# Patient Record
Sex: FEMALE | Race: BLACK OR AFRICAN AMERICAN | Employment: UNEMPLOYED | URBAN - METROPOLITAN AREA
[De-identification: names, ages, dates, MRNs, and addresses within clinical notes are randomized per-mention and may not be internally consistent; named-entity substitution may affect disease eponyms.]

---

## 2017-01-13 ENCOUNTER — HOSPITAL ENCOUNTER (OUTPATIENT)
Dept: SURGERY | Age: 42
Discharge: HOME | End: 2017-01-14
Attending: SURGERY | Admitting: SURGERY

## 2017-01-13 ENCOUNTER — HOSPITAL ENCOUNTER (OUTPATIENT)
Dept: SURGERY | Age: 42
Discharge: HOME | End: 2017-01-13
Attending: SURGERY | Admitting: SURGERY

## 2017-01-13 VITALS
RESPIRATION RATE: 18 BRPM | OXYGEN SATURATION: 99 % | TEMPERATURE: 97.9 F | SYSTOLIC BLOOD PRESSURE: 140 MMHG | DIASTOLIC BLOOD PRESSURE: 76 MMHG | HEART RATE: 80 BPM

## 2017-01-13 VITALS
SYSTOLIC BLOOD PRESSURE: 140 MMHG | HEART RATE: 70 BPM | TEMPERATURE: 97 F | WEIGHT: 202.56 LBS | DIASTOLIC BLOOD PRESSURE: 92 MMHG | OXYGEN SATURATION: 100 % | BODY MASS INDEX: 28.25 KG/M2 | RESPIRATION RATE: 16 BRPM

## 2017-01-13 DIAGNOSIS — R52 PAIN: ICD-10-CM

## 2017-01-13 RX ORDER — HYDROMORPHONE HCL 110MG/55ML
0.25 PATIENT CONTROLLED ANALGESIA SYRINGE INTRAVENOUS EVERY 5 MIN PRN
Status: DISCONTINUED | OUTPATIENT
Start: 2017-01-13 | End: 2017-01-15 | Stop reason: HOSPADM

## 2017-01-13 RX ORDER — MEPERIDINE HYDROCHLORIDE 25 MG/ML
12.5 INJECTION INTRAMUSCULAR; INTRAVENOUS; SUBCUTANEOUS EVERY 5 MIN PRN
Status: DISCONTINUED | OUTPATIENT
Start: 2017-01-13 | End: 2017-01-15 | Stop reason: HOSPADM

## 2017-01-13 RX ORDER — SODIUM CHLORIDE, SODIUM LACTATE, POTASSIUM CHLORIDE, CALCIUM CHLORIDE 600; 310; 30; 20 MG/100ML; MG/100ML; MG/100ML; MG/100ML
INJECTION, SOLUTION INTRAVENOUS CONTINUOUS
Status: DISCONTINUED | OUTPATIENT
Start: 2017-01-13 | End: 2017-01-14 | Stop reason: HOSPADM

## 2017-01-13 RX ORDER — LABETALOL HYDROCHLORIDE 5 MG/ML
5 INJECTION, SOLUTION INTRAVENOUS EVERY 10 MIN PRN
Status: DISCONTINUED | OUTPATIENT
Start: 2017-01-13 | End: 2017-01-15 | Stop reason: HOSPADM

## 2017-01-13 RX ORDER — IBUPROFEN 800 MG/1
800 TABLET ORAL ONCE
Status: COMPLETED | OUTPATIENT
Start: 2017-01-13 | End: 2017-01-13

## 2017-01-13 RX ORDER — CEFAZOLIN SODIUM 2 G/100ML
2 INJECTION, SOLUTION INTRAVENOUS ONCE
Status: COMPLETED | OUTPATIENT
Start: 2017-01-13 | End: 2017-01-13

## 2017-01-13 RX ORDER — FENTANYL CITRATE 50 UG/ML
25 INJECTION, SOLUTION INTRAMUSCULAR; INTRAVENOUS EVERY 5 MIN PRN
Status: DISCONTINUED | OUTPATIENT
Start: 2017-01-13 | End: 2017-01-15 | Stop reason: HOSPADM

## 2017-01-13 RX ORDER — IBUPROFEN 800 MG/1
800 TABLET ORAL EVERY 8 HOURS PRN
Qty: 30 TABLET | Refills: 3 | Status: SHIPPED | OUTPATIENT
Start: 2017-01-13

## 2017-01-13 RX ORDER — HYDROMORPHONE HCL 110MG/55ML
0.5 PATIENT CONTROLLED ANALGESIA SYRINGE INTRAVENOUS EVERY 5 MIN PRN
Status: DISCONTINUED | OUTPATIENT
Start: 2017-01-13 | End: 2017-01-15 | Stop reason: HOSPADM

## 2017-01-13 RX ORDER — LIDOCAINE HYDROCHLORIDE 10 MG/ML
0.5 INJECTION, SOLUTION EPIDURAL; INFILTRATION; INTRACAUDAL; PERINEURAL ONCE
Status: DISCONTINUED | OUTPATIENT
Start: 2017-01-13 | End: 2017-01-14 | Stop reason: HOSPADM

## 2017-01-13 RX ORDER — ONDANSETRON 2 MG/ML
4 INJECTION INTRAMUSCULAR; INTRAVENOUS
Status: ACTIVE | OUTPATIENT
Start: 2017-01-13 | End: 2017-01-13

## 2017-01-13 RX ORDER — FENTANYL CITRATE 50 UG/ML
50 INJECTION, SOLUTION INTRAMUSCULAR; INTRAVENOUS EVERY 5 MIN PRN
Status: DISCONTINUED | OUTPATIENT
Start: 2017-01-13 | End: 2017-01-15 | Stop reason: HOSPADM

## 2017-01-13 RX ORDER — HYDROMORPHONE HCL 110MG/55ML
PATIENT CONTROLLED ANALGESIA SYRINGE INTRAVENOUS
Status: DISCONTINUED
Start: 2017-01-13 | End: 2017-01-14 | Stop reason: HOSPADM

## 2017-01-13 RX ORDER — CEFAZOLIN SODIUM 2 G/100ML
2 INJECTION, SOLUTION INTRAVENOUS
Status: DISCONTINUED | OUTPATIENT
Start: 2017-01-13 | End: 2017-01-14 | Stop reason: HOSPADM

## 2017-01-13 RX ADMIN — CEFAZOLIN SODIUM 2 G: 2 INJECTION, SOLUTION INTRAVENOUS at 15:10

## 2017-01-13 RX ADMIN — IBUPROFEN 800 MG: 800 TABLET ORAL at 17:56

## 2017-01-13 RX ADMIN — Medication 0.5 MG: at 17:11

## 2017-01-13 RX ADMIN — SODIUM CHLORIDE, SODIUM LACTATE, POTASSIUM CHLORIDE, CALCIUM CHLORIDE: 600; 310; 30; 20 INJECTION, SOLUTION INTRAVENOUS at 13:19

## 2017-01-13 RX ADMIN — Medication 0.5 MG: at 17:05

## 2017-01-13 RX ADMIN — Medication 0.5 MG: at 17:00

## 2017-01-13 ASSESSMENT — PAIN SCALES - GENERAL
PAINLEVEL_OUTOF10: 10
PAINLEVEL_OUTOF10: 5
PAINLEVEL_OUTOF10: 10
PAINLEVEL_OUTOF10: 10

## 2017-01-13 ASSESSMENT — PAIN DESCRIPTION - PAIN TYPE
TYPE: SURGICAL PAIN
TYPE: SURGICAL PAIN

## 2017-01-13 ASSESSMENT — ENCOUNTER SYMPTOMS: SHORTNESS OF BREATH: 0

## 2017-01-13 ASSESSMENT — PAIN DESCRIPTION - LOCATION
LOCATION: ABDOMEN
LOCATION: ABDOMEN

## 2017-01-13 ASSESSMENT — PAIN - FUNCTIONAL ASSESSMENT: PAIN_FUNCTIONAL_ASSESSMENT: 0-10

## 2017-09-04 ENCOUNTER — APPOINTMENT (OUTPATIENT)
Dept: GENERAL RADIOLOGY | Facility: CLINIC | Age: 42
End: 2017-09-04
Attending: EMERGENCY MEDICINE
Payer: COMMERCIAL

## 2017-09-04 ENCOUNTER — HOSPITAL ENCOUNTER (EMERGENCY)
Facility: CLINIC | Age: 42
Discharge: HOME | End: 2017-09-04
Attending: EMERGENCY MEDICINE | Admitting: EMERGENCY MEDICINE
Payer: COMMERCIAL

## 2017-09-04 VITALS
WEIGHT: 210 LBS | OXYGEN SATURATION: 100 % | BODY MASS INDEX: 29.4 KG/M2 | TEMPERATURE: 98.3 F | HEIGHT: 71 IN | DIASTOLIC BLOOD PRESSURE: 82 MMHG | RESPIRATION RATE: 16 BRPM | SYSTOLIC BLOOD PRESSURE: 123 MMHG

## 2017-09-04 DIAGNOSIS — S63.619A: ICD-10-CM

## 2017-09-04 DIAGNOSIS — S63.602A: ICD-10-CM

## 2017-09-04 PROCEDURE — 99284 EMERGENCY DEPT VISIT MOD MDM: CPT

## 2017-09-04 PROCEDURE — 29125 APPL SHORT ARM SPLINT STATIC: CPT | Mod: RT

## 2017-09-04 PROCEDURE — 25000132 ZZH RX MED GY IP 250 OP 250 PS 637: Performed by: EMERGENCY MEDICINE

## 2017-09-04 PROCEDURE — 73140 X-RAY EXAM OF FINGER(S): CPT | Mod: LT

## 2017-09-04 RX ORDER — IBUPROFEN 600 MG/1
600 TABLET, FILM COATED ORAL EVERY 6 HOURS PRN
Qty: 30 TABLET | Refills: 1 | Status: ON HOLD | OUTPATIENT
Start: 2017-09-04 | End: 2022-09-14

## 2017-09-04 RX ORDER — IBUPROFEN 800 MG/1
800 TABLET, FILM COATED ORAL ONCE
Status: COMPLETED | OUTPATIENT
Start: 2017-09-04 | End: 2017-09-04

## 2017-09-04 RX ADMIN — IBUPROFEN 800 MG: 800 TABLET, FILM COATED ORAL at 10:52

## 2017-09-04 ASSESSMENT — ENCOUNTER SYMPTOMS
CHILLS: 0
COUGH: 0
ARTHRALGIAS: 1
FEVER: 0

## 2017-09-04 NOTE — ED AVS SNAPSHOT
[facility] Emergency Department    [address]    [facility] [address]    Phone:  [phone]    Fax:  [phone]                                       Tari Nicolas   MRN: [id number]    Department:  [facility] Emergency Department   Date of Visit:  9/4/2017           After Visit Summary Signature Page     I have received my discharge instructions, and my questions have been answered. I have discussed any challenges I see with this plan with the nurse or doctor.    ..........................................................................................................................................  Patient/Patient Representative Signature      ..........................................................................................................................................  Patient Representative Print Name and Relationship to Patient    ..................................................               ................................................  Date                                            Time    ..........................................................................................................................................  Reviewed by Signature/Title    ...................................................              ..............................................  Date                                                            Time

## 2017-09-04 NOTE — ED PROVIDER NOTES
"  History     Chief Complaint:  Thumb Pain    HPI   Tari Nicolas is a 41 year old female who presents to the ED for evaluation of thumb pain. The patient was out running last night when she tripped and fell, injuring her thumb. The patient reports that the base of her left thumb is where the most pain is. She took some Ibuprofen this morning. Of note she is right handed.    Allergies:  No known drug allergies     Medications:    The patient is not currently taking any prescribed medications.    Past Medical History:    Cancer    Past Surgical History:    Cholecystectomy  GYN Surgery    Family History:    History reviewed. No pertinent family history.     Social History:  Smoking status: Never  Alcohol use: No  Marital Status:       Review of Systems   Constitutional: Negative for chills and fever.   Respiratory: Negative for cough.    Musculoskeletal: Positive for arthralgias.   All other systems reviewed and are negative.      Physical Exam     Patient Vitals for the past 24 hrs:   BP Temp Temp src Heart Rate Resp SpO2 Height Weight   09/04/17 1044 123/82 98.3  F (36.8  C) Oral 75 16 100 % 1.803 m (5' 11\") 95.3 kg (210 lb)        Physical Exam   Cardiovascular: Normal rate.    Pulmonary/Chest: Effort normal.   Musculoskeletal:        Left elbow: Normal.        Left wrist: Normal.        Hands:  Nursing note and vitals reviewed.      Emergency Department Course   Imaging:  Radiographic findings were communicated with the patient who voiced understanding of the findings.    X-ray Fingers, 2-3 views:  Normal.  As read by Radiology.    Interventions:  1052: Ibuprofen 800 mg, Oral    Emergency Department Course:  Past medical records, nursing notes, and vitals reviewed.  11:15am: I performed an exam of the patient and obtained history, as documented above.  The patient was sent for a xray while in the emergency department, findings above.    I rechecked the patient. Findings and plan explained to the Patient. Patient " discharged home with instructions regarding supportive care, medications, and reasons to return. The importance of close follow-up was reviewed.     Impression & Plan    Medical Decision Making:  Patient presents with a fall on an outstretched hand. Examination shows tenderness over the MCP joint of her left thumb. Xrays are negative for fracture. She was recommended a thumb spike, a Velcro splint, follow up wit orthopedics if no improvement.    Diagnosis:    ICD-10-CM   1. Sprained finger and thumb of left hand, initial encounter S63.619A    S63.602A       Disposition:  discharged to home        Tonie Nogueira  9/4/2017   [facility] EMERGENCY DEPARTMENT  I, Tonie Nogueira, am serving as a scribe at 11:15 AM on 9/4/2017 to document services personally performed by Leandro Esqueda MD based on my observations and the provider's statements to me.        Leandro Esqueda MD  09/09/17 3648

## 2017-09-04 NOTE — DISCHARGE INSTRUCTIONS
Please wear wrist and thumb splint x 1 week. Ice and elevation of the finger.    If no  Improvement in use of thumb, please follow up with orthopedics for recheck.        Finger Sprain  A sprain is a stretching or tearing of the ligaments that hold a joint together. There are no broken bones. Sprains take 3 to 6 weeks to heal.  A sprained finger may be treated with a splint or buddy tape. This is when you tape the injured finger to the one next to it for support. Minor sprains may require no additional support.  Home care    Keep your hand elevated to reduce pain and swelling. This is very important during the first 48 hours.    Apply an ice pack over the injured area for 15 to 20 minutes every 3 to 6 hours. You should do this for the first 24 to 48 hours. You can make an ice pack by filling a plastic bag that seals at the top with ice cubes and then wrapping it with a thin towel. Continue the use of ice packs for relief of pain and swelling as needed. As the ice melts, be careful to avoid getting any wrap or splint wet. After 48 hours, apply heat (warm shower or warm bath) for 15 to 20 minutes several times a day, or alternate ice and heat.    If buddy tape was applied and it becomes wet or dirty, change it. You may replace it with paper, plastic or cloth tape. Cloth tape and paper tapes must be kept dry. Apply gauze or cotton padding between the fingers, especially at the webbed space. This will help prevent the skin from getting moist and breaking down. Keep the buddy tape in place for at least 4 weeks, or as instructed by your healthcare provider.    If a splint was applied, wear it for the time advised.    You may use over-the-counter pain medicine to control pain, unless another pain medicine was prescribed. If you have chronic liver or kidney disease or ever had a stomach ulcer or GI bleeding, talk with your healthcare provider before using these medicines.  Follow-up care  Follow up with your healthcare  provider as directed. Finger joints will become stiff if immobile for too long. If a splint was applied, ask your healthcare provider when it is safe to begin range-of-motion exercises.  Sometimes fractures don t show up on the first X-ray. Bruises and sprains can sometimes hurt as much as a fracture. These injuries can take time to heal completely. If your symptoms don t improve or they get worse, talk with your healthcare provider. You may need a repeat X-ray. If X-rays were taken, you will be told of any new findings that may affect your care.  When to seek medical advice  Call your healthcare provider right away if any of these occur:    Pain or swelling increases    Fingers or hand becomes cold, blue, numb, or tingly  Date Last Reviewed: 11/20/2015 2000-2017 [employer]. [address] [place] [address], [place] [address]. All rights reserved. This information is not intended as a substitute for professional medical care. Always follow your healthcare professional's instructions.

## 2017-09-04 NOTE — ED AVS SNAPSHOT
[facility] Emergency Department    [address]    [facility] [address]    Phone:  [phone]    Fax:  [phone]                                       Tari Nicolas   MRN: [id number]    Department:  [facility] Emergency Department   Date of Visit:  9/4/2017           Patient Information     Date Of Birth          1975        Your diagnoses for this visit were:     Sprained finger and thumb of left hand, initial encounter        You were seen by Leandro Esqueda MD.      Follow-up Information     [facility]    Why:  if no imoprovement    Contact information:    [address]  [address]  [facility] [address] [phone]        Discharge Instructions       Please wear wrist and thumb splint x 1 week. Ice and elevation of the finger.    If no  Improvement in use of thumb, please follow up with orthopedics for recheck.        Finger Sprain  A sprain is a stretching or tearing of the ligaments that hold a joint together. There are no broken bones. Sprains take 3 to 6 weeks to heal.  A sprained finger may be treated with a splint or buddy tape. This is when you tape the injured finger to the one next to it for support. Minor sprains may require no additional support.  Home care    Keep your hand elevated to reduce pain and swelling. This is very important during the first 48 hours.    Apply an ice pack over the injured area for 15 to 20 minutes every 3 to 6 hours. You should do this for the first 24 to 48 hours. You can make an ice pack by filling a plastic bag that seals at the top with ice cubes and then wrapping it with a thin towel. Continue the use of ice packs for relief of pain and swelling as needed. As the ice melts, be careful to avoid getting any wrap or splint wet. After 48 hours, apply heat (warm shower or warm bath) for 15 to 20 minutes several times a day, or alternate ice and  heat.    If anel tape was applied and it becomes wet or dirty, change it. You may replace it with paper, plastic or cloth tape. Cloth tape and paper tapes must be kept dry. Apply gauze or cotton padding between the fingers, especially at the webbed space. This will help prevent the skin from getting moist and breaking down. Keep the anel tape in place for at least 4 weeks, or as instructed by your healthcare provider.    If a splint was applied, wear it for the time advised.    You may use over-the-counter pain medicine to control pain, unless another pain medicine was prescribed. If you have chronic liver or kidney disease or ever had a stomach ulcer or GI bleeding, talk with your healthcare provider before using these medicines.  Follow-up care  Follow up with your healthcare provider as directed. Finger joints will become stiff if immobile for too long. If a splint was applied, ask your healthcare provider when it is safe to begin range-of-motion exercises.  Sometimes fractures don t show up on the first X-ray. Bruises and sprains can sometimes hurt as much as a fracture. These injuries can take time to heal completely. If your symptoms don t improve or they get worse, talk with your healthcare provider. You may need a repeat X-ray. If X-rays were taken, you will be told of any new findings that may affect your care.  When to seek medical advice  Call your healthcare provider right away if any of these occur:    Pain or swelling increases    Fingers or hand becomes cold, blue, numb, or tingly  Date Last Reviewed: 11/20/2015 2000-2017 [employer]. [address] [place] [address], [place] [address]. All rights reserved. This information is not intended as a substitute for professional medical care. Always follow your healthcare professional's instructions.          Future Appointments        Provider Department Dept Phone Center    9/15/2017 9:00 AM Sharon Sanders PA-C [facility] [phone] [employer]      24 Hour Appointment Hotline       To make an appointment at any [facility], call [phone] ([phone]). If you don't have a family doctor or clinic, we will help you find one. [facility] are conveniently located to serve the needs of you and your family.             Review of your medicines      Notice     You have not been prescribed any medications.            Procedures and tests performed during your visit     Fingers XR, 2-3 views, left      Orders Needing Specimen Collection     None      Pending Results     Date and Time Order Name Status Description    9/4/2017 1049 Fingers XR, 2-3 views, left In process             Pending Culture Results     No orders found from 9/2/2017 to 9/5/2017.            Pending Results Instructions     If you had any lab results that were not finalized at the time of your Discharge, you can call the ED Lab Result RN at [phone]. You will be contacted by this team for any positive Lab results or changes in treatment. The nurses are available 7 days a week from 10A to 6:30P.  You can leave a message 24 hours per day and they will return your call.        Test Results From Your Hospital Stay        9/4/2017 11:03 AM      Result not yet available     Exam Ended                Clinical Quality Measure: Blood Pressure Screening     Your blood pressure was checked while you were in the emergency department today. The last reading we obtained was  BP: 123/82 . Please read the guidelines below about what these numbers mean and what you should do about them.  If your systolic blood pressure (the top number) is less than 120 and your diastolic blood pressure (the bottom number) is less than 80, then your blood pressure is normal. There is nothing more that you need to do about it.  If your systolic blood pressure (the top number) is 120-139 or your diastolic blood pressure (the bottom number) is 80-89, your blood pressure may be higher than  "it should be. You should have your blood pressure rechecked within a year by a primary care provider.  If your systolic blood pressure (the top number) is 140 or greater or your diastolic blood pressure (the bottom number) is 90 or greater, you may have high blood pressure. High blood pressure is treatable, but if left untreated over time it can put you at risk for heart attack, stroke, or kidney failure. You should have your blood pressure rechecked by a primary care provider within the next 4 weeks.  If your provider in the emergency department today gave you specific instructions to follow-up with your doctor or provider even sooner than that, you should follow that instruction and not wait for up to 4 weeks for your follow-up visit.        Thank you for choosing [facility]       Thank you for choosing [facility] for your care. Our goal is always to provide you with excellent care. Hearing back from our patients is one way we can continue to improve our services. Please take a few minutes to complete the written survey that you may receive in the mail after you visit with us. Thank you!        Smile Information     Smile lets you send messages to your doctor, view your test results, renew your prescriptions, schedule appointments and more. To sign up, go to [email or web address] . Click on \"Log in\" on the left side of the screen, which will take you to the Welcome page. Then click on \"Sign up Now\" on the right side of the page.     You will be asked to enter the access code listed below, as well as some personal information. Please follow the directions to create your username and password.     Your access code is: [email or web address]  Expires: 12/3/2017 11:26 AM     Your access code will  in 90 days. If you need help or a new code, please call your [facility] clinic or [phone].        Care EveryWhere ID     This is your Care EveryWhere ID. This could be used by other organizations to access your [facility] " medical records  [id number]        Equal Access to Services     RAKAN LYONS : Rose Nova, jeannie deluna, lashonda liang, arsalan lorenzana. So [facility] [phone].    ATENCIÓN: Si habla español, tiene a downs disposición servicios gratuitos de asistencia lingüística. Llame al [phone].    We comply with applicable federal civil rights laws and [place] laws. We do not discriminate on the basis of race, color, national origin, age, disability sex, sexual orientation or gender identity.            After Visit Summary       This is your record. Keep this with you and show to your community pharmacist(s) and doctor(s) at your next visit.

## 2018-01-21 ENCOUNTER — HEALTH MAINTENANCE LETTER (OUTPATIENT)
Age: 43
End: 2018-01-21

## 2018-02-05 ENCOUNTER — HOSPITAL ENCOUNTER (EMERGENCY)
Facility: CLINIC | Age: 43
Discharge: HOME | End: 2018-02-05
Attending: EMERGENCY MEDICINE | Admitting: EMERGENCY MEDICINE
Payer: COMMERCIAL

## 2018-02-05 VITALS
SYSTOLIC BLOOD PRESSURE: 137 MMHG | RESPIRATION RATE: 20 BRPM | BODY MASS INDEX: 30.54 KG/M2 | TEMPERATURE: 99.8 F | DIASTOLIC BLOOD PRESSURE: 91 MMHG | HEART RATE: 92 BPM | OXYGEN SATURATION: 100 % | WEIGHT: 219 LBS

## 2018-02-05 DIAGNOSIS — J06.9 UPPER RESPIRATORY TRACT INFECTION, UNSPECIFIED TYPE: ICD-10-CM

## 2018-02-05 DIAGNOSIS — R10.84 ABDOMINAL PAIN, GENERALIZED: ICD-10-CM

## 2018-02-05 LAB
ALBUMIN UR-MCNC: NEGATIVE MG/DL
APPEARANCE UR: ABNORMAL
BILIRUB UR QL STRIP: NEGATIVE
COLOR UR AUTO: YELLOW
FLUAV+FLUBV AG SPEC QL: NEGATIVE
FLUAV+FLUBV AG SPEC QL: NEGATIVE
GLUCOSE UR STRIP-MCNC: NEGATIVE MG/DL
HCG UR QL: NEGATIVE
HGB UR QL STRIP: NEGATIVE
KETONES UR STRIP-MCNC: NEGATIVE MG/DL
LEUKOCYTE ESTERASE UR QL STRIP: NEGATIVE
MUCOUS THREADS #/AREA URNS LPF: PRESENT /LPF
NITRATE UR QL: NEGATIVE
PH UR STRIP: 5 PH (ref 5–7)
RBC #/AREA URNS AUTO: <1 /HPF (ref 0–2)
SOURCE: ABNORMAL
SP GR UR STRIP: 1.02 (ref 1–1.03)
SPECIMEN SOURCE: NORMAL
SQUAMOUS #/AREA URNS AUTO: 5 /HPF (ref 0–1)
UROBILINOGEN UR STRIP-MCNC: 2 MG/DL (ref 0–2)
WBC #/AREA URNS AUTO: 4 /HPF (ref 0–2)

## 2018-02-05 PROCEDURE — 87086 URINE CULTURE/COLONY COUNT: CPT | Performed by: EMERGENCY MEDICINE

## 2018-02-05 PROCEDURE — 81025 URINE PREGNANCY TEST: CPT | Performed by: EMERGENCY MEDICINE

## 2018-02-05 PROCEDURE — 81001 URINALYSIS AUTO W/SCOPE: CPT | Performed by: EMERGENCY MEDICINE

## 2018-02-05 PROCEDURE — 99283 EMERGENCY DEPT VISIT LOW MDM: CPT

## 2018-02-05 PROCEDURE — 87804 INFLUENZA ASSAY W/OPTIC: CPT | Performed by: EMERGENCY MEDICINE

## 2018-02-05 ASSESSMENT — ENCOUNTER SYMPTOMS
ABDOMINAL PAIN: 1
DYSURIA: 0
DIARRHEA: 1
COUGH: 1
CONSTIPATION: 1
FREQUENCY: 1

## 2018-02-05 NOTE — ED AVS SNAPSHOT
[facility] Emergency Department    [address]    [facility] [address]    Phone:  [phone]    Fax:  [phone]                                       Tari Nicolas   MRN: [id number]    Department:  [facility] Emergency Department   Date of Visit:  2/5/2018           Patient Information     Date Of Birth          1975        Your diagnoses for this visit were:     Upper respiratory tract infection, unspecified type     Abdominal pain, generalized        You were seen by Tracy Irizarry MD.      Follow-up Information     Follow up with Sharon Sanders PA-C.    Specialty:  Physician Assistant    Contact information:    [address]  [facility] [id number] [phone]        Discharge References/Attachments     ABDOMINAL PAIN, ADULT (ENGLISH)    URI, VIRAL, NO ABX (ADULT) (ENGLISH)      24 Hour Appointment Hotline       To make an appointment at any [facility] clinic, call [phone] ([phone]). If you don't have a family doctor or clinic, we will help you find one. [facility] clinics are conveniently located to serve the needs of you and your family.             Review of your medicines      Our records show that you are taking the medicines listed below. If these are incorrect, please call your family doctor or clinic.        Dose / Directions Last dose taken    ibuprofen 600 MG tablet   Commonly known as:  ADVIL/MOTRIN   Dose:  600 mg   Quantity:  30 tablet        Take 1 tablet (600 mg) by mouth every 6 hours as needed for moderate pain   Refills:  1                Procedures and tests performed during your visit     HCG qualitative urine    Influenza A/B antigen    UA with Microscopic      Orders Needing Specimen Collection     None      Pending Results     No orders found from 2/3/2018 to 2/6/2018.            Pending Culture Results     No orders found from 2/3/2018 to 2/6/2018.            Pending Results Instructions     If you  had any lab results that were not finalized at the time of your Discharge, you can call the ED Lab Result RN at [phone]. You will be contacted by this team for any positive Lab results or changes in treatment. The nurses are available 7 days a week from 10A to 6:30P.  You can leave a message 24 hours per day and they will return your call.        Test Results From Your Hospital Stay        2/5/2018  9:06 PM      Component Results     Component Value Ref Range & Units Status    Influenza A/B Agn Specimen Nares  Final    Influenza A Negative NEG^Negative Final    Influenza B Negative NEG^Negative Final    Test results must be correlated with clinical data. If necessary, results   should be confirmed by a molecular assay or viral culture.           2/5/2018  8:54 PM      Component Results     Component Value Ref Range & Units Status    Color Urine Yellow  Final    Appearance Urine Slightly Cloudy  Final    Glucose Urine Negative NEG^Negative mg/dL Final    Bilirubin Urine Negative NEG^Negative Final    Ketones Urine Negative NEG^Negative mg/dL Final    Specific Gravity Urine 1.019 1.003 - 1.035 Final    Blood Urine Negative NEG^Negative Final    pH Urine 5.0 5.0 - 7.0 pH Final    Protein Albumin Urine Negative NEG^Negative mg/dL Final    Urobilinogen mg/dL 2.0 0.0 - 2.0 mg/dL Final    Nitrite Urine Negative NEG^Negative Final    Leukocyte Esterase Urine Negative NEG^Negative Final    Source Midstream Urine  Final    WBC Urine 4 (H) 0 - 2 /HPF Final    RBC Urine <1 0 - 2 /HPF Final    Squamous Epithelial /HPF Urine 5 (H) 0 - 1 /HPF Final    Mucous Urine Present (A) NEG^Negative /LPF Final         2/5/2018  8:54 PM      Component Results     Component Value Ref Range & Units Status    HCG Qual Urine Negative NEG^Negative Final    This test is for screening purposes.  Results should be interpreted along with   the clinical picture.  Confirmation testing is available if warranted by   ordering [id number], HCG Quantitative  Pregnancy.                  Clinical Quality Measure: Blood Pressure Screening     Your blood pressure was checked while you were in the emergency department today. The last reading we obtained was  BP: (!) 137/91 . Please read the guidelines below about what these numbers mean and what you should do about them.  If your systolic blood pressure (the top number) is less than 120 and your diastolic blood pressure (the bottom number) is less than 80, then your blood pressure is normal. There is nothing more that you need to do about it.  If your systolic blood pressure (the top number) is 120-139 or your diastolic blood pressure (the bottom number) is 80-89, your blood pressure may be higher than it should be. You should have your blood pressure rechecked within a year by a primary care provider.  If your systolic blood pressure (the top number) is 140 or greater or your diastolic blood pressure (the bottom number) is 90 or greater, you may have high blood pressure. High blood pressure is treatable, but if left untreated over time it can put you at risk for heart attack, stroke, or kidney failure. You should have your blood pressure rechecked by a primary care provider within the next 4 weeks.  If your provider in the emergency department today gave you specific instructions to follow-up with your doctor or provider even sooner than that, you should follow that instruction and not wait for up to 4 weeks for your follow-up visit.        Thank you for choosing [facility]       Thank you for choosing [facility] for your care. Our goal is always to provide you with excellent care. Hearing back from our patients is one way we can continue to improve our services. Please take a few minutes to complete the written survey that you may receive in the mail after you visit with us. Thank you!        [employer] Information     [employer] lets you send messages to your doctor, view your test results, renew your prescriptions, schedule  "appointments and more. To sign up, go to [email or web address] . Click on \"Log in\" on the left side of the screen, which will take you to the Welcome page. Then click on \"Sign up Now\" on the right side of the page.     You will be asked to enter the access code listed below, as well as some personal information. Please follow the directions to create your username and password.     Your access code is: [email or web address]  Expires: 2018  9:54 PM     Your access code will  in 90 days. If you need help or a new code, please call your [facility] clinic or [phone].        Care EveryWhere ID     This is your Care EveryWhere ID. This could be used by other organizations to access your [facility] medical records  [id number]        Equal Access to Services     RAKAN LYONS : Rose Nova, jeannie deluna, lashonda liang, arsalan gamino . So [facility] [phone].    ATENCIÓN: Si habla español, tiene a downs disposición servicios gratuitos de asistencia lingüística. Llame al [phone].    We comply with applicable federal civil rights laws and [place] laws. We do not discriminate on the basis of race, color, national origin, age, disability, sex, sexual orientation, or gender identity.            After Visit Summary       This is your record. Keep this with you and show to your community pharmacist(s) and doctor(s) at your next visit.                  "

## 2018-02-05 NOTE — LETTER
February 5, 2018      To Whom It May Concern:      Tari Nicolas was seen in our Emergency Department today, 02/05/18.  I expect her condition to improve over the next 4-5 days.  She may return to work/school when improved.    Sincerely,        Amy Partida RN

## 2018-02-05 NOTE — ED AVS SNAPSHOT
[facility] Emergency Department    [address]    [facility] [address]    Phone:  [phone]    Fax:  [phone]                                       Tari Nicolas   MRN: [id number]    Department:  [facility] Emergency Department   Date of Visit:  2/5/2018           After Visit Summary Signature Page     I have received my discharge instructions, and my questions have been answered. I have discussed any challenges I see with this plan with the nurse or doctor.    ..........................................................................................................................................  Patient/Patient Representative Signature      ..........................................................................................................................................  Patient Representative Print Name and Relationship to Patient    ..................................................               ................................................  Date                                            Time    ..........................................................................................................................................  Reviewed by Signature/Title    ...................................................              ..............................................  Date                                                            Time

## 2018-02-06 NOTE — ED NOTES
Patient states she has been having abdominal pain , constipation and diarrhea since yesterday. Patient states she has also been having cold symptoms and dizziness. ABC intact alert and no distress.

## 2018-02-07 LAB
BACTERIA SPEC CULT: NORMAL
Lab: NORMAL
SPECIMEN SOURCE: NORMAL

## 2022-06-28 PROCEDURE — 88300 SURGICAL PATH GROSS: CPT | Performed by: PATHOLOGY

## 2022-06-29 ENCOUNTER — HOSPITAL ENCOUNTER (EMERGENCY)
Facility: CLINIC | Age: 47
Discharge: HOME | End: 2022-06-29
Attending: EMERGENCY MEDICINE | Admitting: EMERGENCY MEDICINE

## 2022-06-29 ENCOUNTER — APPOINTMENT (OUTPATIENT)
Dept: CT IMAGING | Facility: CLINIC | Age: 47
End: 2022-06-29
Attending: EMERGENCY MEDICINE

## 2022-06-29 ENCOUNTER — LAB REQUISITION (OUTPATIENT)
Dept: LAB | Facility: CLINIC | Age: 47
End: 2022-06-29

## 2022-06-29 VITALS
TEMPERATURE: 98.3 F | BODY MASS INDEX: 39.2 KG/M2 | OXYGEN SATURATION: 100 % | HEART RATE: 97 BPM | DIASTOLIC BLOOD PRESSURE: 87 MMHG | SYSTOLIC BLOOD PRESSURE: 161 MMHG | RESPIRATION RATE: 18 BRPM | WEIGHT: 280 LBS | HEIGHT: 71 IN

## 2022-06-29 DIAGNOSIS — E66.09 OTHER OBESITY DUE TO EXCESS CALORIES: ICD-10-CM

## 2022-06-29 DIAGNOSIS — R56.9 CONVULSIONS, UNSPECIFIED CONVULSION TYPE (H): ICD-10-CM

## 2022-06-29 DIAGNOSIS — Z98.890 HISTORY OF ABDOMINAL SURGERY: ICD-10-CM

## 2022-06-29 LAB
ALBUMIN SERPL-MCNC: 3.7 G/DL (ref 3.4–5)
ALBUMIN UR-MCNC: 30 MG/DL
ALP SERPL-CCNC: 86 U/L (ref 40–150)
ALT SERPL W P-5'-P-CCNC: 51 U/L (ref 0–50)
ANION GAP SERPL CALCULATED.3IONS-SCNC: 6 MMOL/L (ref 3–14)
APPEARANCE UR: CLEAR
AST SERPL W P-5'-P-CCNC: 32 U/L (ref 0–45)
ATRIAL RATE - MUSE: 90 BPM
BASOPHILS # BLD AUTO: 0 10E3/UL (ref 0–0.2)
BASOPHILS NFR BLD AUTO: 0 %
BILIRUB SERPL-MCNC: 0.5 MG/DL (ref 0.2–1.3)
BILIRUB UR QL STRIP: NEGATIVE
BUN SERPL-MCNC: 11 MG/DL (ref 7–30)
CALCIUM SERPL-MCNC: 8.9 MG/DL (ref 8.5–10.1)
CHLORIDE BLD-SCNC: 107 MMOL/L (ref 94–109)
CO2 SERPL-SCNC: 23 MMOL/L (ref 20–32)
COLOR UR AUTO: YELLOW
CREAT SERPL-MCNC: 1.02 MG/DL (ref 0.52–1.04)
DIASTOLIC BLOOD PRESSURE - MUSE: NORMAL MMHG
EOSINOPHIL # BLD AUTO: 0 10E3/UL (ref 0–0.7)
EOSINOPHIL NFR BLD AUTO: 0 %
ERYTHROCYTE [DISTWIDTH] IN BLOOD BY AUTOMATED COUNT: 13.6 % (ref 10–15)
GFR SERPL CREATININE-BSD FRML MDRD: 69 ML/MIN/1.73M2
GLUCOSE BLD-MCNC: 135 MG/DL (ref 70–99)
GLUCOSE UR STRIP-MCNC: NEGATIVE MG/DL
HCT VFR BLD AUTO: 37.2 % (ref 35–47)
HGB BLD-MCNC: 11.7 G/DL (ref 11.7–15.7)
HGB UR QL STRIP: NEGATIVE
HYALINE CASTS: 1 /LPF
IMM GRANULOCYTES # BLD: 0.1 10E3/UL
IMM GRANULOCYTES NFR BLD: 1 %
INTERPRETATION ECG - MUSE: NORMAL
KETONES UR STRIP-MCNC: 40 MG/DL
LACTATE SERPL-SCNC: 2 MMOL/L (ref 0.7–2)
LACTATE SERPL-SCNC: 2.1 MMOL/L (ref 0.7–2)
LACTATE SERPL-SCNC: 2.8 MMOL/L (ref 0.7–2)
LEUKOCYTE ESTERASE UR QL STRIP: NEGATIVE
LIPASE SERPL-CCNC: 66 U/L (ref 73–393)
LYMPHOCYTES # BLD AUTO: 1.6 10E3/UL (ref 0.8–5.3)
LYMPHOCYTES NFR BLD AUTO: 9 %
MCH RBC QN AUTO: 27.7 PG (ref 26.5–33)
MCHC RBC AUTO-ENTMCNC: 31.5 G/DL (ref 31.5–36.5)
MCV RBC AUTO: 88 FL (ref 78–100)
MONOCYTES # BLD AUTO: 1.1 10E3/UL (ref 0–1.3)
MONOCYTES NFR BLD AUTO: 6 %
MUCOUS THREADS #/AREA URNS LPF: PRESENT /LPF
NEUTROPHILS # BLD AUTO: 16.4 10E3/UL (ref 1.6–8.3)
NEUTROPHILS NFR BLD AUTO: 84 %
NITRATE UR QL: NEGATIVE
NRBC # BLD AUTO: 0 10E3/UL
NRBC BLD AUTO-RTO: 0 /100
P AXIS - MUSE: 62 DEGREES
PH UR STRIP: 6 [PH] (ref 5–7)
PLATELET # BLD AUTO: 351 10E3/UL (ref 150–450)
POTASSIUM BLD-SCNC: 4.5 MMOL/L (ref 3.4–5.3)
PR INTERVAL - MUSE: 126 MS
PROT SERPL-MCNC: 7.4 G/DL (ref 6.8–8.8)
QRS DURATION - MUSE: 82 MS
QT - MUSE: 358 MS
QTC - MUSE: 437 MS
R AXIS - MUSE: 44 DEGREES
RBC # BLD AUTO: 4.23 10E6/UL (ref 3.8–5.2)
RBC URINE: 1 /HPF
SODIUM SERPL-SCNC: 136 MMOL/L (ref 133–144)
SP GR UR STRIP: 1.03 (ref 1–1.03)
SQUAMOUS EPITHELIAL: 9 /HPF
SYSTOLIC BLOOD PRESSURE - MUSE: NORMAL MMHG
T AXIS - MUSE: 16 DEGREES
TROPONIN I SERPL HS-MCNC: 40 NG/L
UROBILINOGEN UR STRIP-MCNC: NORMAL MG/DL
VENTRICULAR RATE- MUSE: 90 BPM
WBC # BLD AUTO: 19.3 10E3/UL (ref 4–11)
WBC URINE: 4 /HPF

## 2022-06-29 PROCEDURE — 36415 COLL VENOUS BLD VENIPUNCTURE: CPT | Performed by: EMERGENCY MEDICINE

## 2022-06-29 PROCEDURE — 85025 COMPLETE CBC W/AUTO DIFF WBC: CPT | Performed by: EMERGENCY MEDICINE

## 2022-06-29 PROCEDURE — 83605 ASSAY OF LACTIC ACID: CPT | Performed by: EMERGENCY MEDICINE

## 2022-06-29 PROCEDURE — 250N000013 HC RX MED GY IP 250 OP 250 PS 637: Performed by: EMERGENCY MEDICINE

## 2022-06-29 PROCEDURE — 81001 URINALYSIS AUTO W/SCOPE: CPT | Performed by: EMERGENCY MEDICINE

## 2022-06-29 PROCEDURE — 99285 EMERGENCY DEPT VISIT HI MDM: CPT | Mod: 25

## 2022-06-29 PROCEDURE — 258N000003 HC RX IP 258 OP 636: Performed by: EMERGENCY MEDICINE

## 2022-06-29 PROCEDURE — 84484 ASSAY OF TROPONIN QUANT: CPT | Performed by: EMERGENCY MEDICINE

## 2022-06-29 PROCEDURE — 96361 HYDRATE IV INFUSION ADD-ON: CPT

## 2022-06-29 PROCEDURE — 83690 ASSAY OF LIPASE: CPT | Performed by: EMERGENCY MEDICINE

## 2022-06-29 PROCEDURE — 96360 HYDRATION IV INFUSION INIT: CPT

## 2022-06-29 PROCEDURE — 80053 COMPREHEN METABOLIC PANEL: CPT | Performed by: EMERGENCY MEDICINE

## 2022-06-29 PROCEDURE — 93005 ELECTROCARDIOGRAM TRACING: CPT

## 2022-06-29 PROCEDURE — 82040 ASSAY OF SERUM ALBUMIN: CPT | Performed by: EMERGENCY MEDICINE

## 2022-06-29 PROCEDURE — 70450 CT HEAD/BRAIN W/O DYE: CPT

## 2022-06-29 RX ORDER — ACETAMINOPHEN 500 MG
1000 TABLET ORAL ONCE
Status: COMPLETED | OUTPATIENT
Start: 2022-06-29 | End: 2022-06-29

## 2022-06-29 RX ADMIN — SODIUM CHLORIDE 1000 ML: 9 INJECTION, SOLUTION INTRAVENOUS at 14:39

## 2022-06-29 RX ADMIN — SODIUM CHLORIDE 1000 ML: 9 INJECTION, SOLUTION INTRAVENOUS at 16:39

## 2022-06-29 RX ADMIN — SODIUM CHLORIDE 1000 ML: 9 INJECTION, SOLUTION INTRAVENOUS at 19:02

## 2022-06-29 RX ADMIN — ACETAMINOPHEN 1000 MG: 500 TABLET, FILM COATED ORAL at 16:38

## 2022-06-29 NOTE — CONSULTS
[facility]    History and Physical  General Surgery     Date of Admission:  6/29/2022    Chief Complaint   Possible seizure    History is obtained from the patient and patient's spouse    History of Present Illness   Tari Nicolas is a 46 year old female well known to me who presents to [facility] ER for evaluation of possible seizures. She underwent outpatient bariatric surgery yesterday at [facility]; her postoperative course was uneventful. Her  noted she went to use the bathroom early this morning and sustained a fall with a several second period where she was sweating, not breathing and shaking; he reports she has had seizure-like episodes in the past but not to this extent. She has no memory of the episode. She then went to sleep and had another similar episode earlier this afternoon. The patient's  called into our clinic and I instructed them to present to the ER for further evaluation given her recent procedure. The patient does report postoperative abdominal pain and some cramping with sips of liquids; her  reports she did vomit yesterday after returning home but they otherwise deny fevers, chest pain, shortness of breath, diarrhea, abdominal bloating or other symptoms at this time.    Past Medical History    I have reviewed this patient's medical history and updated it with pertinent information if needed.   Past Medical History:   Diagnosis Date     Ovarian cancer        Past Surgical History   I have reviewed this patient's surgical history and updated it with pertinent information if needed.  Past Surgical History:   Procedure Laterality Date     CHOLECYSTECTOMY       GYN SURGERY - Hysterectomy     Laparoscopic sleeve gastrectomy    Prior to Admission Medications   Prior to Admission Medications   Prescriptions Last Dose Informant Patient Reported? Taking?   ibuprofen (ADVIL/MOTRIN) 600 MG tablet   No No   Sig: Take 1 tablet (600 mg) by  mouth every 6 hours as needed for moderate pain      Facility-Administered Medications: None     Allergies   Allergies   Allergen Reactions     Ciprofloxacin Hives     Hydrocodone Dizziness     Oxycodone Dizziness       Social History   Denies tobacco, EtOH or drug use.    Family History   Family history reviewed with patient and is noncontributory.    Review of Systems   The 10 point Review of Systems is negative other than noted in the HPI or here.     Physical Exam   Temp: 98.3  F (36.8  C) Temp src: Oral BP: (!) 173/109 Pulse: 93   Resp: 28 SpO2: 100 % O2 Device: None (Room air)    Vital Signs with Ranges  Temp:  [98.3  F (36.8  C)] 98.3  F (36.8  C)  Pulse:  [90-96] 93  Resp:  [18-28] 28  BP: (132-173)/() 173/109  SpO2:  [100 %] 100 %  280 lbs 0 oz    Constitutional: fatigued, alert and cooperative  Eyes: Lids and lashes normal, pupils equal, round and reactive to light, extra ocular muscles intact, sclera clear, conjunctiva normal  Respiratory: No increased work of breathing, good air exchange, clear to auscultation bilaterally, no crackles or wheezing  Cardiovascular: regular rate and rhythm  GI: scars noted right upper quadrant, left upper quadrant, epigastric and are c/d/i with Dermabond in place, non-distended and appropriately tender to palpation autumn-incisionally-- no rebound or guarding is noted on exam  Skin: normal skin color, texture, turgor and no jaundice  Musculoskeletal: no lower extremity pitting edema present  tone is normal  Neurologic: Awake, alert, oriented to name, place and time.  Cranial nerves II-XII are grossly intact.  Motor is 5 out of 5 bilaterally.  Cerebellar finger to nose, heel to shin intact.  Sensory is intact.  Babinski down going, Romberg negative, and gait is normal.    Data   Results for orders placed or performed during the hospital encounter of 06/29/22 (from the past 24 hour(s))   CBC with platelets + differential    Narrative    The following orders were created  for panel order CBC with platelets + differential.  Procedure                               Abnormality         Status                     ---------                               -----------         ------                     CBC with platelets and d...[[id number]]  Abnormal            Final result                 Please view results for these tests on the individual orders.   Comprehensive metabolic panel   Result Value Ref Range    Sodium 136 133 - 144 mmol/L    Potassium 4.5 3.4 - 5.3 mmol/L    Chloride 107 94 - 109 mmol/L    Carbon Dioxide (CO2) 23 20 - 32 mmol/L    Anion Gap 6 3 - 14 mmol/L    Urea Nitrogen 11 7 - 30 mg/dL    Creatinine 1.02 0.52 - 1.04 mg/dL    Calcium 8.9 8.5 - 10.1 mg/dL    Glucose 135 (H) 70 - 99 mg/dL    Alkaline Phosphatase 86 40 - 150 U/L    AST 32 0 - 45 U/L    ALT 51 (H) 0 - 50 U/L    Protein Total 7.4 6.8 - 8.8 g/dL    Albumin 3.7 3.4 - 5.0 g/dL    Bilirubin Total 0.5 0.2 - 1.3 mg/dL    GFR Estimate 69 >60 mL/min/1.73m2   Lipase   Result Value Ref Range    Lipase 66 (L) 73 - 393 U/L   Lactic acid whole blood   Result Value Ref Range    Lactic Acid 2.1 (H) 0.7 - 2.0 mmol/L   CBC with platelets and differential   Result Value Ref Range    WBC Count 19.3 (H) 4.0 - 11.0 10e3/uL    RBC Count 4.23 3.80 - 5.20 10e6/uL    Hemoglobin 11.7 11.7 - 15.7 g/dL    Hematocrit 37.2 35.0 - 47.0 %    MCV 88 78 - 100 fL    MCH 27.7 26.5 - 33.0 pg    MCHC 31.5 31.5 - 36.5 g/dL    RDW 13.6 10.0 - 15.0 %    Platelet Count 351 150 - 450 10e3/uL    % Neutrophils 84 %    % Lymphocytes 9 %    % Monocytes 6 %    % Eosinophils 0 %    % Basophils 0 %    % Immature Granulocytes 1 %    NRBCs per 100 WBC 0 <1 /100    Absolute Neutrophils 16.4 (H) 1.6 - 8.3 10e3/uL    Absolute Lymphocytes 1.6 0.8 - 5.3 10e3/uL    Absolute Monocytes 1.1 0.0 - 1.3 10e3/uL    Absolute Eosinophils 0.0 0.0 - 0.7 10e3/uL    Absolute Basophils 0.0 0.0 - 0.2 10e3/uL    Absolute Immature Granulocytes 0.1 <=0.4 10e3/uL    Absolute NRBCs 0.0  10e3/uL   Troponin I   Result Value Ref Range    Troponin I High Sensitivity 40 <54 ng/L   CT Head w/o Contrast    Narrative    CT HEAD W/O CONTRAST 6/29/2022 3:53 PM    INDICATION: possible first GTC seizure  TECHNIQUE: CT scan of the head without contrast. Dose reduction  techniques were used.  CONTRAST: None.  COMPARISON: None.    FINDINGS:   No intracranial hemorrhage, extraaxial collection, mass effect or CT  evidence of acute infarct.  Normal parenchymal density for age. The  ventricles and sulci are normal for age. Small amount of soft tissue  swelling at the left temporalis muscle. No skull fracture.  Unremarkable orbits. Paranasal sinuses are free of significant  disease. Clear mastoid air cells.      Impression    IMPRESSION:  1.  No acute intracranial abnormality.    2.  Small amount of soft tissue swelling at the left temporalis muscle  without skull fracture.    ARUN NARAYAN MD         SYSTEM ID:  [id number]   UA with Microscopic   Result Value Ref Range    Color Urine Yellow Colorless, Straw, Light Yellow, Yellow    Appearance Urine Clear Clear    Glucose Urine Negative Negative mg/dL    Bilirubin Urine Negative Negative    Ketones Urine 40  (A) Negative mg/dL    Specific Gravity Urine 1.031 1.003 - 1.035    Blood Urine Negative Negative    pH Urine 6.0 5.0 - 7.0    Protein Albumin Urine 30  (A) Negative mg/dL    Urobilinogen Urine Normal Normal, 2.0 mg/dL    Nitrite Urine Negative Negative    Leukocyte Esterase Urine Negative Negative    Mucus Urine Present (A) None Seen /LPF    RBC Urine 1 <=2 /HPF    WBC Urine 4 <=5 /HPF    Squamous Epithelials Urine 9 (H) <=1 /HPF    Hyaline Casts Urine 1 <=2 /LPF   Lactic acid whole blood   Result Value Ref Range    Lactic Acid 2.1 (H) 0.7 - 2.0 mmol/L      Assessment:  1. Loss of consciousness, possible seizure  2. S/p bariatric surgery  3. Leukocytosis  4. Elevated lactate    Plan:  Patient's vital signs show hypertension but heart rate normal. Abdominal  examination is reassuring given minimal tenderness and no peritoneal signs. CT head imaging obtained which did not demonstrate any obvious pathology leading to seizure-like event described by the patient/patient's . Labs show mild lactic acidosis with lactate of 2.1 and leukocytosis with WBC 19, however this could be post-surgical given her recent procedure. Patient was given IV fluids in the ER for hydration. Repeat lactate pending. Agree with ED recommendation to repeat lactate and for IV fluid hydration. If abdominal exam or vital signs decline, or worsening lactic acidosis, recommend CT abdomen imaging to evaluate for potential intra-abdominal pathology given her recent surgery, and consider admission for close observation. If admitted, I will follow along closely. Agree with outpatient neurology consultation to address potential seizures given similar episodes prior to surgery.    Please do not hesitate to contact me with any questions or concerns.    Michael Villarreal MD  General & Bariatric Surgery  [facility]  Office: [phone]  Direct: [phone]

## 2022-06-29 NOTE — ED NOTES
Bed: ED26  Expected date:   Expected time:   Means of arrival:   Comments:  [facility] - 46 F seizures eta 1414

## 2022-06-29 NOTE — ED TRIAGE NOTES
Pt had gastric sleeve yesterday.  reports syncope/ seizure like activity last night lasting about 30 seconds. Pt did not  pain medication from pharmacy. Another syncope/ seizure like activity today lasting 30-45 seconds. EMS blood sugar 150.      Triage Assessment     Row Name 06/29/22 1429       Respiratory WDL    Rhythm/Pattern, Respiratory depth regular;pattern regular;unlabored    Mucous Membranes pink;intact;moist    Cough Frequency no cough       Cardiac WDL    Cardiac Rhythm radial pulse regular       Chest Pain Assessment    Chest Pain Location --  Pt denies chest pain       Peripheral/Neurovascular WDL    Capillary Refill, General less than/equal to 3 secs       Cognitive/Neuro/Behavioral WDL    Level of Consciousness alert    Orientation oriented x 4    Speech clear;spontaneous;logical    Mood/Behavior calm;cooperative

## 2022-06-29 NOTE — ED PROVIDER NOTES
"  History   Chief Complaint:  Seizures       The history is provided by the spouse and the patient.   History supplemented by electronic chart review     Tari Nicolas is a 46 year old female with history of hyperlipidemia, ovarian cancer s/p remote resection now in remission, depression, and calculus of gallbladder, among others who presents after a gastric sleeve procedure yesterday with two seizure-like episodes that began earlier this morning at roughly 0400 and again at 1200. The patient's  reports that she was having some abdominal pain throughout the evening and woke up earlier this morning at roughly 0400 to use the bathroom, but upon returning she lunged toward the bed with her shorts around her ankles and hit the side of the bed and fell to the floor. While on the floor, her  reports that her whole body was shaking, she was not breathing, and she was not responding for about 20 seconds. She was also diaphoretic. The  notes that when she awoke from this episode, she was rapidly responsive but denies any memory of it happening, but she stayed on the floor for about 90 minutes. Afterward she was finally able to sleep in her bed. Her  then reports that she awoke at 1200 and walked to the bathroom, where another episode similar to the first occurred for about 1 minute. When she awoke from the episode she did not remember the event, but proceeded to lay on the floor for roughly 30 minutes before her  assisted her back to bed. Her  notes history of minor \"convulsions\" while sleeping on occasion in the past, but notes that they were easily resolved.  notes her surgery yesterday went fine. She reports control of bowel movements and bladder. The patient denies alcohol use or drug use. The patient's  notes that she has yet to use her medications prescribed after for her procedure. The patient denies regular medication use. She also denies any known heart " "issues. The patient denies that she may be pregnant given prior hysterectomy. The patient denies having a regular neurologist.     Review of Systems   All other systems reviewed and are negative.      Allergies:  Ciprofloxacin  Hydrocodone  Oxycodone    Medications:  The patient is currently on no regular medications.    Past Medical History:     Calculus of gallbladder without cholecystitis  Ovarian cancer  Hyperlipidemia  Diverticulitis  Pancreatitis  Depression  Migraine with aura  Surgical menopause  Left ankle sprain  Right ovarian cyst  Irregular intermenstrual bleeding  Maternal blood transfusion  Fibroids     Past Surgical History:    Cholecystectomy  Tubal ligation   Hysterectomy     Family History:     Father: alcohol abuse,   Mother: colon polyps, hypertension, anxiety, arthritis, depression, migraines, hyperlipidemia    Social History:    The patient presents to the ED with her  and children.  The patient presents to the ED via EMS.  The patient is a dispatcher.      Physical Exam     Patient Vitals for the past 24 hrs:   BP Temp Temp src Pulse Resp SpO2 Height Weight   06/29/22 2000 (!) 161/87 -- -- 97 18 100 % -- --   06/29/22 1900 (!) 161/89 -- -- -- -- -- -- --   06/29/22 1600 (!) 173/109 -- -- 93 28 100 % -- --   06/29/22 1530 (!) 161/92 -- -- 90 26 100 % -- --   06/29/22 1500 (!) 154/89 -- -- 90 21 100 % -- --   06/29/22 1445 -- -- -- 90 18 100 % -- --   06/29/22 1430 132/87 -- -- 96 24 100 % -- --   06/29/22 1426 (!) 139/94 98.3  F (36.8  C) Oral 93 18 100 % 1.803 m (5' 11\") 127 kg (280 lb)     Physical Exam  General: Nontoxic-appearing woman sitting upright in room 26,  and children at bedside  HENT: mucous membranes moist, tongue normal without evidence of trauma  CV: rate as above, regular rhythm, no lower extremity edema, no murmur audible  Resp: normal effort, speaks in full phrases, no stridor, no cough observed  GI: abdomen soft and with mild expected degree of tenderness " postoperatively, no distention, bowel sounds present, no guarding  MSK: no bony tenderness  Skin: appropriately warm and dry, laparoscopic surgical sites to abdomen clean dry and intact  Neuro: awake, alert, clear speech, fully oriented, face symmetric,  normal, strength and sensation intact in all extr, no nuchal rigidity, ambulation not initially tested  Psych: Cooperative, no apparent hallucinations    Emergency Department Course   ECG 1  ECG taken at 1443, ECG read at 1503  Normal sinus rhythm  Nonspecific ST abnormality   Rate 90 bpm. DC interval 126 ms. QRS duration 82 ms. QT/QTc 358/437 ms. P-R-T axes 62 44 16.     Imaging:  CT Head w/o Contrast   Final Result   IMPRESSION:   1.  No acute intracranial abnormality.      2.  Small amount of soft tissue swelling at the left temporalis muscle   without skull fracture.      ARUN NARAYAN MD            SYSTEM ID:  [id number]        Report per radiology    Laboratory:  Labs Ordered and Resulted from Time of ED Arrival to Time of ED Departure   COMPREHENSIVE METABOLIC PANEL - Abnormal       Result Value    Sodium 136      Potassium 4.5      Chloride 107      Carbon Dioxide (CO2) 23      Anion Gap 6      Urea Nitrogen 11      Creatinine 1.02      Calcium 8.9      Glucose 135 (*)     Alkaline Phosphatase 86      AST 32      ALT 51 (*)     Protein Total 7.4      Albumin 3.7      Bilirubin Total 0.5      GFR Estimate 69     LIPASE - Abnormal    Lipase 66 (*)    LACTIC ACID WHOLE BLOOD - Abnormal    Lactic Acid 2.1 (*)    CBC WITH PLATELETS AND DIFFERENTIAL - Abnormal    WBC Count 19.3 (*)     RBC Count 4.23      Hemoglobin 11.7      Hematocrit 37.2      MCV 88      MCH 27.7      MCHC 31.5      RDW 13.6      Platelet Count 351      % Neutrophils 84      % Lymphocytes 9      % Monocytes 6      % Eosinophils 0      % Basophils 0      % Immature Granulocytes 1      NRBCs per 100 WBC 0      Absolute Neutrophils 16.4 (*)     Absolute Lymphocytes 1.6      Absolute  Monocytes 1.1      Absolute Eosinophils 0.0      Absolute Basophils 0.0      Absolute Immature Granulocytes 0.1      Absolute NRBCs 0.0     ROUTINE UA WITH MICROSCOPIC - Abnormal    Color Urine Yellow      Appearance Urine Clear      Glucose Urine Negative      Bilirubin Urine Negative      Ketones Urine 40  (*)     Specific Gravity Urine 1.031      Blood Urine Negative      pH Urine 6.0      Protein Albumin Urine 30  (*)     Urobilinogen Urine Normal      Nitrite Urine Negative      Leukocyte Esterase Urine Negative      Mucus Urine Present (*)     RBC Urine 1      WBC Urine 4      Squamous Epithelials Urine 9 (*)     Hyaline Casts Urine 1     LACTIC ACID WHOLE BLOOD - Abnormal    Lactic Acid 2.8 (*)    TROPONIN I - Normal    Troponin I High Sensitivity 40     LACTIC ACID WHOLE BLOOD - Normal    Lactic Acid 2.0       Emergency Department Course:       Reviewed:  I reviewed nursing notes, vitals, past medical history and Care Everywhere    Assessments:  1511 I obtained history and examined the patient as noted above.   1559 I rechecked the patient and explained findings.   1849 I rechecked the patient and explained findings.   2043 I rechecked the patient and explained findings.     Consults:  1556 I spoke with Dr. Jon from neurology at  regarding the patient's presentation and plan of care.  1818 I spoke with Dr. Villarreal from bariatric surgery, who came to the ED, regarding the patient's presentation and plan of care.    Interventions:  1439 NS 1000 mL IV  1638 Tylenol 1000 mg PO  1639 NS 1000 mL IV  1902 NS 1000 mL IV    Disposition:  The patient was discharged to home.     Impression & Plan   Medical Decision Making:  I considered not only the possibility of generalized seizures, but also syncope from various causes, pulmonary embolism, arrhythmia, pseudoseizures, direct surgical complications and others.  She does not have peritonitis.  Her abdominal pain is modest and has not worsened since her recent surgery.   Her bariatric surgeon initially contacted me by phone, and later even came to the emergency department to evaluate her at bedside given her recent surgery, though the patient, her , her surgeon, and I all think that it is unlikely she is experiencing any direct structural surgical complication.  She is tolerating oral intake.  She did not have bowel or bladder incontinence, tongue biting, or prolonged confusion to raise higher suspicion for seizure, though she received standard work-up for possible seizure and was given corresponding precautions regarding driving and other activities.  The rationale for not initiating antiepileptic drugs was discussed, along with the rationale for close follow-up through neurology.  Initial lactic acid elevated, improved with fluids.  She has returned to her neurologic baseline and is ambulatory.  Patient and  eager for discharge home which was granted.  Return here for sudden worsening.    Diagnosis:    ICD-10-CM    1. Convulsions, unspecified convulsion type (H)  R56.9    2. History of abdominal surgery  Z98.890      Scribe Disclosure:  I, Andrew Ava, am serving as a scribe at 2:51 PM on 6/29/2022 to document services personally performed by Bradley Yanez MD based on my observations and the provider's statements to me.            Sean Yanez MD  06/30/22 4208

## 2022-06-30 NOTE — ED NOTES
Patient's 3rd liter of fluids finished, repeat lactic sent.   Patient complaining of abdominal soreness. Too soon for more tylenol, does not want anything stronger.   Patient ambulatory to the bathroom independently. Denies dizziness.

## 2022-06-30 NOTE — ED NOTES
Discharge instructions reviewed with patient, who verbalized understanding. Patient departed the ED in no apparent distress.

## 2022-07-05 LAB
PATH REPORT.COMMENTS IMP SPEC: NORMAL
PATH REPORT.COMMENTS IMP SPEC: NORMAL
PATH REPORT.FINAL DX SPEC: NORMAL
PATH REPORT.GROSS SPEC: NORMAL
PATH REPORT.MICROSCOPIC SPEC OTHER STN: NORMAL
PATH REPORT.RELEVANT HX SPEC: NORMAL
PHOTO IMAGE: NORMAL

## 2022-09-04 ENCOUNTER — NURSE TRIAGE (OUTPATIENT)
Dept: NURSING | Facility: CLINIC | Age: 47
End: 2022-09-04

## 2022-09-05 NOTE — TELEPHONE ENCOUNTER
"For the past week and a half, Tari reports having \"bad Acid Reflux\"  She does not have a history of GERD.   She has never experienced anything like this before    In addition she reports:  - Vomiting every day for the past 3 days - 1-2 episodes a day  - Headache - Rated 8/10  - Feels thick drainage in the back of her throat  - Facial pain/pressure - Behind bridge of nose/between eyes    She has tried, without relief:  - Pepto Bismol - vomited it back up  - Tums    She has not tried OTC pain relievers    Advised to see PCP within 2-3 days  Care Advice reviewed    Ashlie Mcneil RN  [facility]      Reason for Disposition    [1] Sinus congestion (pressure, fullness) AND [2] present > 10 days    Nausea lasts > 1 week    Additional Information    Negative: SEVERE difficulty breathing (e.g., struggling for each breath, speaks in single words)    Negative: Sounds like a life-threatening emergency to the triager    Negative: [1] Difficulty breathing AND [2] not from stuffy nose (e.g., not relieved by cleaning out the nose)    Negative: [1] SEVERE headache AND [2] fever    Negative: [1] Redness or swelling on the cheek, forehead or around the eye AND [2] fever    Negative: Fever > 104 F (40 C)    Negative: Patient sounds very sick or weak to the triager    Negative: [1] SEVERE pain AND [2] not improved 2 hours after pain medicine    Negative: [1] Redness or swelling on the cheek, forehead or around the eye AND [2] no fever    Negative: [1] Fever > 101 F (38.3 C) AND [2] age > 60 years    Negative: [1] Fever > 100.0 F (37.8 C) AND [2] bedridden (e.g., nursing home patient, CVA, chronic illness, recovering from surgery)    Negative: [1] Fever > 100.0 F (37.8 C) AND [2] diabetes mellitus or weak immune system (e.g., HIV positive, cancer chemo, splenectomy, organ transplant, chronic steroids)    Negative: Fever present > 3 days (72 hours)    Negative: [1] Fever returns after gone for over 24 hours AND " [2] symptoms worse or not improved    Negative: [1] Sinus pain (not just congestion) AND [2] fever    Negative: Earache    Negative: Shock suspected (e.g., cold/pale/clammy skin, too weak to stand, low BP, rapid pulse)    Negative: Sounds like a life-threatening emergency to the triager    Negative: Unable to walk, or can only walk with assistance (e.g., requires support)    Negative: Difficulty breathing    Negative: [1] Insulin-dependent diabetes (Type I) AND [2] glucose > 400 mg/dl (22 mmol/l)    Negative: [1] Drinking very little AND [2] dehydration suspected (e.g., no urine > 12 hours, very dry mouth, very lightheaded)    Negative: Patient sounds very sick or weak to the triager    Negative: Fever > 104 F (40 C)    Negative: [1] Fever > 101 F (38.3 C) AND [2] age > 60 years    Negative: [1] Fever > 100.0 F (37.8 C) AND [2] bedridden (e.g., nursing home patient, CVA, chronic illness, recovering from surgery)    Negative: [1] Fever > 100.0 F (37.8 C) AND [2] diabetes mellitus or weak immune system (e.g., HIV positive, cancer chemo, splenectomy, organ transplant, chronic steroids)    Negative: Taking any of the following medications: digoxin (Lanoxin), lithium, theophylline, phenytoin (Dilantin)    Negative: Yellowish color of the skin or white of the eye (i.e., jaundice)    Negative: Fever present > 3 days (72 hours)    Negative: Receiving cancer chemotherapy medication    Negative: Taking prescription medication that could cause nausea (e.g., narcotics/opiates, antibiotics, OCPs, many others)    Protocols used: SINUS PAIN OR CONGESTION-A-AH, NAUSEA-A-AH

## 2022-09-06 ENCOUNTER — APPOINTMENT (OUTPATIENT)
Dept: CT IMAGING | Facility: CLINIC | Age: 47
End: 2022-09-06
Attending: EMERGENCY MEDICINE

## 2022-09-06 ENCOUNTER — HOSPITAL ENCOUNTER (EMERGENCY)
Facility: CLINIC | Age: 47
Discharge: HOME | End: 2022-09-06
Attending: EMERGENCY MEDICINE | Admitting: EMERGENCY MEDICINE

## 2022-09-06 VITALS
OXYGEN SATURATION: 100 % | HEART RATE: 69 BPM | TEMPERATURE: 97.8 F | SYSTOLIC BLOOD PRESSURE: 155 MMHG | BODY MASS INDEX: 31.8 KG/M2 | DIASTOLIC BLOOD PRESSURE: 93 MMHG | WEIGHT: 228 LBS | RESPIRATION RATE: 18 BRPM

## 2022-09-06 DIAGNOSIS — R11.2 NAUSEA AND VOMITING, INTRACTABILITY OF VOMITING NOT SPECIFIED, UNSPECIFIED VOMITING TYPE: ICD-10-CM

## 2022-09-06 DIAGNOSIS — K91.870 POSTOPERATIVE HEMATOMA INVOLVING DIGESTIVE SYSTEM FOLLOWING DIGESTIVE SYSTEM PROCEDURE: ICD-10-CM

## 2022-09-06 LAB
ALBUMIN SERPL-MCNC: 3.8 G/DL (ref 3.4–5)
ALBUMIN UR-MCNC: 50 MG/DL
ALP SERPL-CCNC: 98 U/L (ref 40–150)
ALT SERPL W P-5'-P-CCNC: 31 U/L (ref 0–50)
ANION GAP SERPL CALCULATED.3IONS-SCNC: 8 MMOL/L (ref 3–14)
APPEARANCE UR: CLEAR
AST SERPL W P-5'-P-CCNC: 24 U/L (ref 0–45)
BACTERIA #/AREA URNS HPF: ABNORMAL /HPF
BASOPHILS # BLD AUTO: 0.1 10E3/UL (ref 0–0.2)
BASOPHILS NFR BLD AUTO: 0 %
BILIRUB SERPL-MCNC: 0.9 MG/DL (ref 0.2–1.3)
BILIRUB UR QL STRIP: ABNORMAL
BUN SERPL-MCNC: 9 MG/DL (ref 7–30)
CALCIUM SERPL-MCNC: 10.1 MG/DL (ref 8.5–10.1)
CHLORIDE BLD-SCNC: 101 MMOL/L (ref 94–109)
CO2 SERPL-SCNC: 25 MMOL/L (ref 20–32)
COLOR UR AUTO: YELLOW
CREAT SERPL-MCNC: 0.59 MG/DL (ref 0.52–1.04)
EOSINOPHIL # BLD AUTO: 0.1 10E3/UL (ref 0–0.7)
EOSINOPHIL NFR BLD AUTO: 1 %
ERYTHROCYTE [DISTWIDTH] IN BLOOD BY AUTOMATED COUNT: 15.3 % (ref 10–15)
FLUAV RNA SPEC QL NAA+PROBE: NEGATIVE
FLUBV RNA RESP QL NAA+PROBE: NEGATIVE
GFR SERPL CREATININE-BSD FRML MDRD: >90 ML/MIN/1.73M2
GLUCOSE BLD-MCNC: 95 MG/DL (ref 70–99)
GLUCOSE UR STRIP-MCNC: NEGATIVE MG/DL
HCT VFR BLD AUTO: 43.2 % (ref 35–47)
HGB BLD-MCNC: 13.3 G/DL (ref 11.7–15.7)
HGB UR QL STRIP: NEGATIVE
IMM GRANULOCYTES # BLD: 0.1 10E3/UL
IMM GRANULOCYTES NFR BLD: 0 %
KETONES UR STRIP-MCNC: 150 MG/DL
LEUKOCYTE ESTERASE UR QL STRIP: NEGATIVE
LIPASE SERPL-CCNC: 123 U/L (ref 73–393)
LYMPHOCYTES # BLD AUTO: 2.3 10E3/UL (ref 0.8–5.3)
LYMPHOCYTES NFR BLD AUTO: 19 %
MCH RBC QN AUTO: 25.8 PG (ref 26.5–33)
MCHC RBC AUTO-ENTMCNC: 30.8 G/DL (ref 31.5–36.5)
MCV RBC AUTO: 84 FL (ref 78–100)
MONOCYTES # BLD AUTO: 0.7 10E3/UL (ref 0–1.3)
MONOCYTES NFR BLD AUTO: 6 %
MUCOUS THREADS #/AREA URNS LPF: PRESENT /LPF
NEUTROPHILS # BLD AUTO: 9.3 10E3/UL (ref 1.6–8.3)
NEUTROPHILS NFR BLD AUTO: 74 %
NITRATE UR QL: NEGATIVE
NRBC # BLD AUTO: 0 10E3/UL
NRBC BLD AUTO-RTO: 0 /100
PH UR STRIP: 6.5 [PH] (ref 5–7)
PLATELET # BLD AUTO: 277 10E3/UL (ref 150–450)
POTASSIUM BLD-SCNC: 4 MMOL/L (ref 3.4–5.3)
PROT SERPL-MCNC: 8.4 G/DL (ref 6.8–8.8)
RBC # BLD AUTO: 5.15 10E6/UL (ref 3.8–5.2)
RBC URINE: 6 /HPF
RSV RNA SPEC NAA+PROBE: NEGATIVE
SARS-COV-2 RNA RESP QL NAA+PROBE: NEGATIVE
SODIUM SERPL-SCNC: 134 MMOL/L (ref 133–144)
SP GR UR STRIP: 1.01 (ref 1–1.03)
SQUAMOUS EPITHELIAL: 6 /HPF
UROBILINOGEN UR STRIP-MCNC: 4 MG/DL
WBC # BLD AUTO: 12.5 10E3/UL (ref 4–11)
WBC URINE: 2 /HPF

## 2022-09-06 PROCEDURE — 81001 URINALYSIS AUTO W/SCOPE: CPT | Performed by: EMERGENCY MEDICINE

## 2022-09-06 PROCEDURE — 80053 COMPREHEN METABOLIC PANEL: CPT | Performed by: EMERGENCY MEDICINE

## 2022-09-06 PROCEDURE — 87637 SARSCOV2&INF A&B&RSV AMP PRB: CPT | Performed by: EMERGENCY MEDICINE

## 2022-09-06 PROCEDURE — 96375 TX/PRO/DX INJ NEW DRUG ADDON: CPT

## 2022-09-06 PROCEDURE — 85025 COMPLETE CBC W/AUTO DIFF WBC: CPT | Performed by: EMERGENCY MEDICINE

## 2022-09-06 PROCEDURE — 250N000009 HC RX 250: Performed by: EMERGENCY MEDICINE

## 2022-09-06 PROCEDURE — 250N000011 HC RX IP 250 OP 636: Performed by: EMERGENCY MEDICINE

## 2022-09-06 PROCEDURE — 74177 CT ABD & PELVIS W/CONTRAST: CPT

## 2022-09-06 PROCEDURE — 96374 THER/PROPH/DIAG INJ IV PUSH: CPT | Mod: 59

## 2022-09-06 PROCEDURE — 96361 HYDRATE IV INFUSION ADD-ON: CPT

## 2022-09-06 PROCEDURE — 99285 EMERGENCY DEPT VISIT HI MDM: CPT | Mod: CS,25

## 2022-09-06 PROCEDURE — 258N000003 HC RX IP 258 OP 636: Performed by: EMERGENCY MEDICINE

## 2022-09-06 PROCEDURE — 250N000013 HC RX MED GY IP 250 OP 250 PS 637: Performed by: EMERGENCY MEDICINE

## 2022-09-06 PROCEDURE — 83690 ASSAY OF LIPASE: CPT | Performed by: EMERGENCY MEDICINE

## 2022-09-06 PROCEDURE — 36415 COLL VENOUS BLD VENIPUNCTURE: CPT | Performed by: EMERGENCY MEDICINE

## 2022-09-06 PROCEDURE — C9803 HOPD COVID-19 SPEC COLLECT: HCPCS

## 2022-09-06 RX ORDER — PANTOPRAZOLE SODIUM 40 MG/1
40 TABLET, DELAYED RELEASE ORAL DAILY
Qty: 30 TABLET | Refills: 0 | Status: ON HOLD | OUTPATIENT
Start: 2022-09-06 | End: 2022-09-19

## 2022-09-06 RX ORDER — PANTOPRAZOLE SODIUM 40 MG/1
40 TABLET, DELAYED RELEASE ORAL ONCE
Status: COMPLETED | OUTPATIENT
Start: 2022-09-06 | End: 2022-09-06

## 2022-09-06 RX ORDER — ONDANSETRON 4 MG/1
4 TABLET, ORALLY DISINTEGRATING ORAL EVERY 4 HOURS PRN
Qty: 10 TABLET | Refills: 0 | Status: SHIPPED | OUTPATIENT
Start: 2022-09-06 | End: 2022-09-09

## 2022-09-06 RX ORDER — ONDANSETRON 2 MG/ML
4 INJECTION INTRAMUSCULAR; INTRAVENOUS EVERY 30 MIN PRN
Status: DISCONTINUED | OUTPATIENT
Start: 2022-09-06 | End: 2022-09-07 | Stop reason: HOSPADM

## 2022-09-06 RX ORDER — IOPAMIDOL 755 MG/ML
114 INJECTION, SOLUTION INTRAVASCULAR ONCE
Status: COMPLETED | OUTPATIENT
Start: 2022-09-06 | End: 2022-09-06

## 2022-09-06 RX ORDER — HYDROMORPHONE HYDROCHLORIDE 1 MG/ML
0.5 INJECTION, SOLUTION INTRAMUSCULAR; INTRAVENOUS; SUBCUTANEOUS ONCE
Status: DISCONTINUED | OUTPATIENT
Start: 2022-09-06 | End: 2022-09-07 | Stop reason: HOSPADM

## 2022-09-06 RX ORDER — ACETAMINOPHEN 325 MG/1
650 TABLET ORAL ONCE
Status: COMPLETED | OUTPATIENT
Start: 2022-09-06 | End: 2022-09-06

## 2022-09-06 RX ORDER — KETOROLAC TROMETHAMINE 15 MG/ML
15 INJECTION, SOLUTION INTRAMUSCULAR; INTRAVENOUS ONCE
Status: COMPLETED | OUTPATIENT
Start: 2022-09-06 | End: 2022-09-06

## 2022-09-06 RX ADMIN — ONDANSETRON 4 MG: 2 INJECTION INTRAMUSCULAR; INTRAVENOUS at 16:59

## 2022-09-06 RX ADMIN — KETOROLAC TROMETHAMINE 15 MG: 15 INJECTION, SOLUTION INTRAMUSCULAR; INTRAVENOUS at 17:01

## 2022-09-06 RX ADMIN — PANTOPRAZOLE SODIUM 40 MG: 40 TABLET, DELAYED RELEASE ORAL at 22:47

## 2022-09-06 RX ADMIN — SODIUM CHLORIDE 1000 ML: 9 INJECTION, SOLUTION INTRAVENOUS at 17:02

## 2022-09-06 RX ADMIN — SODIUM CHLORIDE 73 ML: 9 INJECTION, SOLUTION INTRAVENOUS at 19:48

## 2022-09-06 RX ADMIN — IOPAMIDOL 114 ML: 755 INJECTION, SOLUTION INTRAVENOUS at 19:48

## 2022-09-06 RX ADMIN — ACETAMINOPHEN 650 MG: 325 TABLET ORAL at 21:39

## 2022-09-06 ASSESSMENT — ENCOUNTER SYMPTOMS
HEMATURIA: 0
FEVER: 0
VOMITING: 1
HEADACHES: 0
DYSURIA: 0
FREQUENCY: 0
ABDOMINAL PAIN: 1
COUGH: 1
DIFFICULTY URINATING: 0
NUMBNESS: 1
DIARRHEA: 1

## 2022-09-06 ASSESSMENT — ACTIVITIES OF DAILY LIVING (ADL): ADLS_ACUITY_SCORE: 33

## 2022-09-06 NOTE — ED NOTES
Rapid Assessment Note    History:   Tari Nicolsa is a 46 year old female who presents with one week of abdominal cramping, vomiting, and diarrhea. Patient says she has been unable to keep anything down since the onset of these symptoms. She also endorses lower extremity numbness and a small rash to her chest. Patient has never experienced symptoms like this before.  No urinary symptoms. She denies fever and headaches. History of a hysterectomy, cholecystectomy, and gastric sleeve. Patient is not vaccinated for COVID-19. She lives with her  and son.     Vitals:   Patient Vitals for the past 24 hrs:   BP Temp Temp src Pulse Resp SpO2 Weight   09/06/22 1640 (!) 155/98 97.3  F (36.3  C) Temporal 88 18 99 % 103.4 kg (228 lb)       Exam:   General:  Alert, interactive  Cardiovascular:  Well perfused  Lungs:  No respiratory distress, no accessory muscle use  Neuro:  Moving all 4 extremities  Skin:  Warm, dry, erythematous macular rash over sternum  Psych:  Normal affect    Plan of Care:   I evaluated the patient and developed an initial plan of care. I discussed this plan and explained that I, or one of my partners, would be returning to complete the evaluation.     I, Pat Wooten, am serving as a scribe to document services personally performed by Davon Jackson MD based on my observations and the provider's statements to me.    09/06/2022  [facility]    Portions of this medical record were completed by a scribe. UPON MY REVIEW AND AUTHENTICATION BY ELECTRONIC SIGNATURE, this confirms (a) I performed the applicable clinical services, and (b) the record is accurate.        Davon Jackson MD  09/06/22 [id number]

## 2022-09-07 NOTE — ED PROVIDER NOTES
History   Chief Complaint:  Abdominal Pain       HPI   Tari Nicolas is a 46 year old female s/p gastric sleeve in June with Dr. Santillan who presents with one week of abdominal cramping, vomiting, and diarrhea. Patient says she has been unable to keep anything down since the onset of these symptoms. She also endorses lower extremity numbness, a small rash to her chest, and a mild cough. Patient has never experienced symptoms like this before.  No urinary symptoms. She denies fever and headaches. She has not had any diarrhea today. History of a hysterectomy and cholecystectomy. Patient is not vaccinated for COVID-19. She lives with her  and son.     Review of Systems   Constitutional: Negative for fever.   Respiratory: Positive for cough.    Gastrointestinal: Positive for abdominal pain, diarrhea (since resolved) and vomiting.   Genitourinary: Negative for decreased urine volume, difficulty urinating, dysuria, frequency, hematuria and urgency.   Skin: Positive for rash.   Neurological: Positive for numbness. Negative for headaches.   All other systems reviewed and are negative.    Allergies:  Ciprofloxacin  Hydrocodone  Oxycodone    Medications:  The patient is not currently taking any prescribed medications.    Past Medical History:     Gallstones   Pancreatitis   Depression  Migraine  Diverticulitis  Granulosa of cell tumor of ovary  Right ovarian cyst   Fibroids  Migraine headache with aura   Ovarian cancer     Past Surgical History:    Cholecystectomy    Tubal ligation   Hysterectomy      Family History:    Father: Alcohol abuse  Mother: Arthritis, depression, HTN, migraines, hyperlipidemia, colon polyps     Social History:  The patient presents to the ED with a family member  PCP: Sharon Jama     Physical Exam     Patient Vitals for the past 24 hrs:   BP Temp Temp src Pulse Resp SpO2 Weight   09/06/22 2142 (!) 155/93 -- -- -- -- -- --   09/06/22 2110 (!) 168/103 97.8  F (36.6  C) Oral 69 --  100 % --   09/06/22 1640 (!) 155/98 97.3  F (36.3  C) Temporal 88 18 99 % 103.4 kg (228 lb)     Physical Exam  Constitutional: Black middle age female supine. No respiratory distress.   HENT: No signs of trauma. Oropharynx clear, no redness or discharge.   Eyes: EOM are normal. Pupils are equal, round, and reactive to light.   Neck: Normal range of motion. No JVD present. No cervical adenopathy.  Cardiovascular: Regular rhythm.  Exam reveals no gallop and no friction rub.    No murmur heard.  Pulmonary/Chest: Bilateral breath sounds normal. No wheezes, rhonchi or rales.  Abdominal: Soft. No rebound or guarding. Upper abdominal tenderness. 2+ femoral pulses. No CVA tenderness.   Musculoskeletal: No edema. No tenderness.   Lymphadenopathy: No lymphadenopathy.   Neurological: Alert and oriented to person, place, and time. Normal strength. Coordination normal.   Skin: Skin is warm and dry. No rash noted. No erythema.     Emergency Department Course   Imaging:  CT Abdomen Pelvis w Contrast   Final Result   IMPRESSION:    1.  Prior sleeve gastrectomy with large fluid collection along the greater curvature of the stomach measuring up to 8.1 cm, favor evolving postoperative hematoma, less likely sequela of leak/abscess.   2.  No evidence of bowel obstruction.        Report per radiology    Laboratory:  Labs Ordered and Resulted from Time of ED Arrival to Time of ED Departure   ROUTINE UA WITH MICROSCOPIC REFLEX TO CULTURE - Abnormal       Result Value    Color Urine Yellow      Appearance Urine Clear      Glucose Urine Negative      Bilirubin Urine Small (*)     Ketones Urine 150  (*)     Specific Gravity Urine 1.015      Blood Urine Negative      pH Urine 6.5      Protein Albumin Urine 50  (*)     Urobilinogen Urine 4.0 (*)     Nitrite Urine Negative      Leukocyte Esterase Urine Negative      Bacteria Urine Few (*)     Mucus Urine Present (*)     RBC Urine 6 (*)     WBC Urine 2      Squamous Epithelials Urine 6 (*)    CBC  WITH PLATELETS AND DIFFERENTIAL - Abnormal    WBC Count 12.5 (*)     RBC Count 5.15      Hemoglobin 13.3      Hematocrit 43.2      MCV 84      MCH 25.8 (*)     MCHC 30.8 (*)     RDW 15.3 (*)     Platelet Count 277      % Neutrophils 74      % Lymphocytes 19      % Monocytes 6      % Eosinophils 1      % Basophils 0      % Immature Granulocytes 0      NRBCs per 100 WBC 0      Absolute Neutrophils 9.3 (*)     Absolute Lymphocytes 2.3      Absolute Monocytes 0.7      Absolute Eosinophils 0.1      Absolute Basophils 0.1      Absolute Immature Granulocytes 0.1      Absolute NRBCs 0.0     COMPREHENSIVE METABOLIC PANEL - Normal    Sodium 134      Potassium 4.0      Chloride 101      Carbon Dioxide (CO2) 25      Anion Gap 8      Urea Nitrogen 9      Creatinine 0.59      Calcium 10.1      Glucose 95      Alkaline Phosphatase 98      AST 24      ALT 31      Protein Total 8.4      Albumin 3.8      Bilirubin Total 0.9      GFR Estimate >90     LIPASE - Normal    Lipase 123     INFLUENZA A/B & SARS-COV2 PCR MULTIPLEX - Normal    Influenza A PCR Negative      Influenza B PCR Negative      RSV PCR Negative      SARS CoV2 PCR Negative     ENTERIC BACTERIA AND VIRUS PANEL BY LAYLA STOOL        Emergency Department Course:   Reviewed:  I reviewed nursing notes, vitals, past medical history and Care Everywhere    Assessments:  2118 I obtained history and examined the patient as noted above.   2300 I rechecked the patient and explained findings.     Consults:   I spoke with Dr. Dennis, the patient's gastric sleeve surgeon, regarding the patient.     Interventions:  1659 Zofran  4 mg  IV  1701 Toradol  15 mg  IV  1702 NS  1L  IV  2132 Dilaudid  0.5 mg  IV  2139 Tylenol  650 mg  PO  2247 Protonix  40 mg  PO    Disposition:  The patient was discharged to home.     Impression & Plan     Medical Decision Making:  This is a 46 year old who presents to the ED complaining of some vomiting, diarrhea, and abdominal pain. She had gastric sleeve  surgery about two months ago which was initially complicated with post op bleeding and passing out. She states the past week she has been feeling some abdominal discomfort. She has had some occasional loose stool and vomiting and has not able to keep anything down. On exam there was some minimal upper abdominal pain. She is not feverish and does not appear septic or toxic. Her workup reveals a negative COVID swab, minimally elevated white count, a stable hemoglobin, and normal chemistries. Her urine does show some ketones but only two white cells. CT was done which does show a fluid collection near the greater curve of the stomach which is most likely representing a post op hematoma. Patient received IV fluids, Zofran, and Protonix. She was able to eat and keep fluids down. I did speak with Dr. Dennis, her surgeon. At this point the patient prefers going home. Dr. Dennis will see her tomorrow and will either do an upper GI or barium endoscopy. I will give the patient Protonix and Zofran to use at home. She is aware to come back for increasing pain, fever, or persistent vomiting.     Covid-19  Tari Nicolas was evaluated during a global COVID-19 pandemic, which necessitated consideration that the patient might be at risk for infection with the SARS-CoV-2 virus that causes COVID-19.   Applicable protocols for evaluation were followed during the patient's care.   COVID-19 was considered as part of the patient's evaluation. The plan for testing is:  a test was obtained during this visit.    COVID-19 Virus (Coronavirus) by PCR Nasopharyngeal Swab: Negative      Diagnosis:    ICD-10-CM    1. Nausea and vomiting, intractability of vomiting not specified, unspecified vomiting type  R11.2    2. Postoperative hematoma involving digestive system following digestive system procedure  K91.870        Discharge Medications:  Discharge Medication List as of 9/6/2022 10:50 PM      START taking these medications    Details    ondansetron (ZOFRAN ODT) 4 MG ODT tab Take 1 tablet (4 mg) by mouth every 4 hours as needed for nausea, Disp-10 tablet, R-0, Local Print      pantoprazole (PROTONIX) 40 MG EC tablet Take 1 tablet (40 mg) by mouth daily for 30 doses, Disp-30 tablet, R-0, Local Print             Scribe Disclosure:  I, Pat Wooten, am serving as a scribe at 9:18 PM on 9/6/2022 to document services personally performed by Davon Jackson MD based on my observations and the provider's statements to me.            Davon Jackson MD  09/06/22 [id number]

## 2022-09-14 ENCOUNTER — HOSPITAL ENCOUNTER (OUTPATIENT)
Facility: CLINIC | Age: 47
DRG: 908 | End: 2022-09-14
Attending: SURGERY | Admitting: SURGERY

## 2022-09-14 ENCOUNTER — HOSPITAL ENCOUNTER (INPATIENT)
Facility: CLINIC | Age: 47
LOS: 6 days | Discharge: HOME | DRG: 908 | End: 2022-09-20
Attending: SURGERY | Admitting: SURGERY

## 2022-09-14 ENCOUNTER — TELEPHONE (OUTPATIENT)
Dept: INTERVENTIONAL RADIOLOGY/VASCULAR | Facility: CLINIC | Age: 47
End: 2022-09-14

## 2022-09-14 DIAGNOSIS — T88.8XXA FLUID COLLECTION AT SURGICAL SITE, INITIAL ENCOUNTER: ICD-10-CM

## 2022-09-14 DIAGNOSIS — T88.8XXD FLUID COLLECTION AT SURGICAL SITE, SUBSEQUENT ENCOUNTER: ICD-10-CM

## 2022-09-14 DIAGNOSIS — K21.9 GASTROESOPHAGEAL REFLUX DISEASE WITHOUT ESOPHAGITIS: Primary | ICD-10-CM

## 2022-09-14 DIAGNOSIS — Z90.3 H/O GASTRIC SLEEVE: ICD-10-CM

## 2022-09-14 DIAGNOSIS — I10 HYPERTENSION, UNSPECIFIED TYPE: ICD-10-CM

## 2022-09-14 PROBLEM — E86.0 DEHYDRATION: Status: ACTIVE | Noted: 2022-09-14

## 2022-09-14 PROCEDURE — 258N000003 HC RX IP 258 OP 636: Performed by: SURGERY

## 2022-09-14 PROCEDURE — 120N000001 HC R&B MED SURG/OB

## 2022-09-14 RX ORDER — PROCHLORPERAZINE MALEATE 10 MG
10 TABLET ORAL EVERY 6 HOURS PRN
Status: DISCONTINUED | OUTPATIENT
Start: 2022-09-14 | End: 2022-09-20 | Stop reason: HOSPADM

## 2022-09-14 RX ORDER — HYDROMORPHONE HCL IN WATER/PF 6 MG/30 ML
0.4 PATIENT CONTROLLED ANALGESIA SYRINGE INTRAVENOUS
Status: DISCONTINUED | OUTPATIENT
Start: 2022-09-14 | End: 2022-09-20 | Stop reason: HOSPADM

## 2022-09-14 RX ORDER — DIPHENHYDRAMINE HYDROCHLORIDE 50 MG/ML
25 INJECTION INTRAMUSCULAR; INTRAVENOUS EVERY 6 HOURS PRN
Status: DISCONTINUED | OUTPATIENT
Start: 2022-09-14 | End: 2022-09-20 | Stop reason: HOSPADM

## 2022-09-14 RX ORDER — NALOXONE HYDROCHLORIDE 0.4 MG/ML
0.2 INJECTION, SOLUTION INTRAMUSCULAR; INTRAVENOUS; SUBCUTANEOUS
Status: DISCONTINUED | OUTPATIENT
Start: 2022-09-14 | End: 2022-09-20 | Stop reason: HOSPADM

## 2022-09-14 RX ORDER — ONDANSETRON 4 MG/1
4 TABLET, ORALLY DISINTEGRATING ORAL EVERY 6 HOURS PRN
Status: DISCONTINUED | OUTPATIENT
Start: 2022-09-14 | End: 2022-09-20 | Stop reason: HOSPADM

## 2022-09-14 RX ORDER — ONDANSETRON 2 MG/ML
4 INJECTION INTRAMUSCULAR; INTRAVENOUS EVERY 6 HOURS PRN
Status: DISCONTINUED | OUTPATIENT
Start: 2022-09-14 | End: 2022-09-20 | Stop reason: HOSPADM

## 2022-09-14 RX ORDER — HYDROMORPHONE HCL IN WATER/PF 6 MG/30 ML
0.2 PATIENT CONTROLLED ANALGESIA SYRINGE INTRAVENOUS
Status: DISCONTINUED | OUTPATIENT
Start: 2022-09-14 | End: 2022-09-20 | Stop reason: HOSPADM

## 2022-09-14 RX ORDER — NALOXONE HYDROCHLORIDE 0.4 MG/ML
0.4 INJECTION, SOLUTION INTRAMUSCULAR; INTRAVENOUS; SUBCUTANEOUS
Status: DISCONTINUED | OUTPATIENT
Start: 2022-09-14 | End: 2022-09-20 | Stop reason: HOSPADM

## 2022-09-14 RX ORDER — METOCLOPRAMIDE 5 MG/1
5 TABLET ORAL EVERY 6 HOURS PRN
Status: ON HOLD | COMMUNITY
Start: 2022-09-07 | End: 2022-09-19

## 2022-09-14 RX ORDER — SODIUM CHLORIDE, SODIUM LACTATE, POTASSIUM CHLORIDE, CALCIUM CHLORIDE 600; 310; 30; 20 MG/100ML; MG/100ML; MG/100ML; MG/100ML
INJECTION, SOLUTION INTRAVENOUS CONTINUOUS
Status: DISCONTINUED | OUTPATIENT
Start: 2022-09-14 | End: 2022-09-16

## 2022-09-14 RX ORDER — DIPHENHYDRAMINE HCL 25 MG
25 CAPSULE ORAL EVERY 6 HOURS PRN
Status: DISCONTINUED | OUTPATIENT
Start: 2022-09-14 | End: 2022-09-20 | Stop reason: HOSPADM

## 2022-09-14 RX ORDER — LIDOCAINE 40 MG/G
CREAM TOPICAL
Status: DISCONTINUED | OUTPATIENT
Start: 2022-09-14 | End: 2022-09-16

## 2022-09-14 RX ORDER — ACETAMINOPHEN 325 MG/1
325-650 TABLET ORAL EVERY 6 HOURS PRN
Status: ON HOLD | COMMUNITY
End: 2022-09-19

## 2022-09-14 RX ORDER — METOPROLOL TARTRATE 1 MG/ML
5 INJECTION, SOLUTION INTRAVENOUS EVERY 6 HOURS PRN
Status: DISCONTINUED | OUTPATIENT
Start: 2022-09-14 | End: 2022-09-18

## 2022-09-14 RX ADMIN — SODIUM CHLORIDE, POTASSIUM CHLORIDE, SODIUM LACTATE AND CALCIUM CHLORIDE: 600; 310; 30; 20 INJECTION, SOLUTION INTRAVENOUS at 19:03

## 2022-09-14 ASSESSMENT — ENCOUNTER SYMPTOMS
COLOR CHANGE: 0
CHEST TIGHTNESS: 0
NAUSEA: 1
VOMITING: 1
BACK PAIN: 0
SEIZURES: 0
SHORTNESS OF BREATH: 0
CHILLS: 0
APPETITE CHANGE: 1
DIFFICULTY URINATING: 0
PALPITATIONS: 0
FLANK PAIN: 0
WHEEZING: 0
FATIGUE: 1
FEVER: 0
WOUND: 0
AGITATION: 0
SORE THROAT: 0
CONFUSION: 0
APNEA: 0
ARTHRALGIAS: 0
NECK PAIN: 0
NUMBNESS: 0
HALLUCINATIONS: 0
HEADACHES: 1
COUGH: 0
WEAKNESS: 1
ABDOMINAL PAIN: 1

## 2022-09-14 ASSESSMENT — ACTIVITIES OF DAILY LIVING (ADL)
ADLS_ACUITY_SCORE: 35
WEAR_GLASSES_OR_BLIND: NO
TOILETING_ISSUES: NO
ADLS_ACUITY_SCORE: 35
FALL_HISTORY_WITHIN_LAST_SIX_MONTHS: NO
WEAR_GLASSES_OR_BLIND: NO
ADLS_ACUITY_SCORE: 29
CONCENTRATING,_REMEMBERING_OR_MAKING_DECISIONS_DIFFICULTY: NO
DOING_ERRANDS_INDEPENDENTLY_DIFFICULTY: NO
DIFFICULTY_EATING/SWALLOWING: NO
CHANGE_IN_FUNCTIONAL_STATUS_SINCE_ONSET_OF_CURRENT_ILLNESS/INJURY: NO
WALKING_OR_CLIMBING_STAIRS_DIFFICULTY: NO
DRESSING/BATHING_DIFFICULTY: NO

## 2022-09-14 NOTE — H&P
Tari Nicolas is an 46 year old female who presents for direct admission secondary to dehydration, nausea, vomiting, GERD, p.o. intolerance, and suspected gastric outlet obstruction secondary to perigastric fluid collection.  She is directly admitted to [facility] following her clinic visit at [facility] earlier today.  The patient has a 2-week history of progressively worsening nausea, vomiting, GERD, and p.o. intolerance; she has a history of gastric surgery in June 2022 and her postoperative course was complicated by acute blood loss anemia which was managed conservatively with iron supplementation and has since corrected.  She was progressing well with her postoperative diet, and at her 6-week follow-up appointment was tolerating solid foods with minimal heartburn symptoms. However, she began experiencing worsening nausea, vomiting, and acid reflux; she presented to [facility] ER last week and underwent CT imaging that demonstrated evidence of a perigastric intra-abdominal fluid collection which is thought to be secondary to a resolving postoperative hematoma.  Since her ER discharge, she has continued to struggle with p.o. intake and now experiences several episodes of nausea, nonbloody/nonbilious vomiting, and is unable to tolerate even small amounts of clear liquids.  Given her symptoms, she return to clinic earlier today and was given 2 L of crystalloid IV fluids to assist with hydration.  I reviewed her CT imaging with her and given her persistent symptoms, recommend percutaneous drainage of the fluid collection in an attempt to reduce extrinsic compression of her gastric remnant and alleviate her symptoms.  The patient currently denies chest pain, shortness of breath, fevers, chills diarrhea, constipation, or bloody stools; she does report severe nausea, vomiting, and inability to keep down clear liquids.    Past Medical History:   Diagnosis Date     Cancer  (H)     Ovarian cancer       Allergies:   Allergies   Allergen Reactions     Ciprofloxacin Hives     Hydrocodone Dizziness     Oxycodone Dizziness       Active Problems:    * No active hospital problems. *    There were no vitals taken for this visit.    Review of Systems   Constitutional: Positive for appetite change and fatigue. Negative for chills and fever.   HENT: Negative for congestion and sore throat.    Respiratory: Negative for apnea, cough, chest tightness, shortness of breath and wheezing.    Cardiovascular: Negative for chest pain, palpitations and leg swelling.   Gastrointestinal: Positive for abdominal pain, nausea and vomiting.   Genitourinary: Negative for difficulty urinating and flank pain.   Musculoskeletal: Negative for arthralgias, back pain and neck pain.   Skin: Negative for color change, rash and wound.   Neurological: Positive for weakness and headaches. Negative for seizures, syncope and numbness.   Psychiatric/Behavioral: Negative for agitation, confusion and hallucinations.       Physical Exam  Constitutional:       Appearance: She is ill-appearing.   HENT:      Head: Normocephalic and atraumatic.      Nose: Nose normal. No congestion.      Mouth/Throat:      Mouth: Mucous membranes are dry.      Pharynx: Oropharynx is clear.   Eyes:      Extraocular Movements: Extraocular movements intact.      Conjunctiva/sclera: Conjunctivae normal.      Pupils: Pupils are equal, round, and reactive to light.   Cardiovascular:      Rate and Rhythm: Regular rhythm. Tachycardia present.   Pulmonary:      Effort: Pulmonary effort is normal. No respiratory distress.      Breath sounds: Normal breath sounds. No stridor. No wheezing or rales.   Abdominal:      General: Abdomen is flat. There is no distension.      Palpations: Abdomen is soft.      Tenderness: There is abdominal tenderness (in the LUQ and epigastrium). There is no guarding or rebound.   Musculoskeletal:         General: No swelling or  deformity. Normal range of motion.      Cervical back: Normal range of motion and neck supple.      Right lower leg: No edema.      Left lower leg: No edema.   Skin:     General: Skin is warm and dry.      Coloration: Skin is not jaundiced.   Neurological:      General: No focal deficit present.      Mental Status: She is alert and oriented to person, place, and time.      Sensory: No sensory deficit.      Gait: Gait normal.   Psychiatric:         Mood and Affect: Mood normal.         Behavior: Behavior normal.         Thought Content: Thought content normal.         Assessment/Plan:  1.  Dehydration  -Begin peripheral IV, will begin continuous IV crystalloid fluids for rehydration; will obtain metabolic panel and electrolytes and correct any deficiencies as needed. Strict Is/Os-- monitor UOP    2.  Nausea/vomiting  -Parenteral antiemetics as needed for nausea control.  She will remain n.p.o. for her procedure tomorrow    3.  Perigastric intra-abdominal fluid collection, thought to be secondary to postoperative hematoma  -IR consulted and will perform percutaneous drainage tomorrow morning.  We will follow-up drain output and perform routine drain care to facilitate drainage of the collection; at this time, patient does not have any evidence of sepsis/infection, will hold off on IV antibiotics at this time and await drainage    4.  Gastric outlet obstruction secondary to perigastric fluid collection  -Proceed with IR drainage.  Nausea and vomiting likely secondary to this, will continue antiemetics as needed    VTE ppx: SCDs while in bed; encourage ambulation. Will hold chemical ppx at this time while awaiting IR drain placement    Disposition: Floor    CRISTINA OLIVA MD  9/14/2022

## 2022-09-15 ENCOUNTER — APPOINTMENT (OUTPATIENT)
Dept: CT IMAGING | Facility: CLINIC | Age: 47
DRG: 908 | End: 2022-09-15
Attending: SURGERY

## 2022-09-15 PROBLEM — R11.2 NAUSEA AND VOMITING: Status: ACTIVE | Noted: 2022-09-15

## 2022-09-15 PROBLEM — T88.8XXA FLUID COLLECTION AT SURGICAL SITE: Status: ACTIVE | Noted: 2022-09-15

## 2022-09-15 LAB
ALBUMIN SERPL-MCNC: 3.2 G/DL (ref 3.4–5)
ALP SERPL-CCNC: 84 U/L (ref 40–150)
ALT SERPL W P-5'-P-CCNC: 22 U/L (ref 0–50)
ANION GAP SERPL CALCULATED.3IONS-SCNC: 10 MMOL/L (ref 3–14)
APTT PPP: 29 SECONDS (ref 22–38)
AST SERPL W P-5'-P-CCNC: 14 U/L (ref 0–45)
BASOPHILS # BLD AUTO: 0 10E3/UL (ref 0–0.2)
BASOPHILS NFR BLD AUTO: 0 %
BILIRUB SERPL-MCNC: 1.3 MG/DL (ref 0.2–1.3)
BUN SERPL-MCNC: 7 MG/DL (ref 7–30)
CALCIUM SERPL-MCNC: 9.3 MG/DL (ref 8.5–10.1)
CHLORIDE BLD-SCNC: 101 MMOL/L (ref 94–109)
CO2 SERPL-SCNC: 24 MMOL/L (ref 20–32)
CREAT SERPL-MCNC: 0.56 MG/DL (ref 0.52–1.04)
EOSINOPHIL # BLD AUTO: 0.2 10E3/UL (ref 0–0.7)
EOSINOPHIL NFR BLD AUTO: 2 %
ERYTHROCYTE [DISTWIDTH] IN BLOOD BY AUTOMATED COUNT: 15.4 % (ref 10–15)
GFR SERPL CREATININE-BSD FRML MDRD: >90 ML/MIN/1.73M2
GLUCOSE BLD-MCNC: 90 MG/DL (ref 70–99)
GLUCOSE BLDC GLUCOMTR-MCNC: 104 MG/DL (ref 70–99)
GRAM STAIN RESULT: NORMAL
GRAM STAIN RESULT: NORMAL
HCT VFR BLD AUTO: 38.2 % (ref 35–47)
HGB BLD-MCNC: 11.7 G/DL (ref 11.7–15.7)
IMM GRANULOCYTES # BLD: 0.1 10E3/UL
IMM GRANULOCYTES NFR BLD: 0 %
INR PPP: 1.19 (ref 0.85–1.15)
LYMPHOCYTES # BLD AUTO: 2.6 10E3/UL (ref 0.8–5.3)
LYMPHOCYTES NFR BLD AUTO: 23 %
MAGNESIUM SERPL-MCNC: 1.9 MG/DL (ref 1.6–2.3)
MCH RBC QN AUTO: 25.5 PG (ref 26.5–33)
MCHC RBC AUTO-ENTMCNC: 30.6 G/DL (ref 31.5–36.5)
MCV RBC AUTO: 83 FL (ref 78–100)
MONOCYTES # BLD AUTO: 0.7 10E3/UL (ref 0–1.3)
MONOCYTES NFR BLD AUTO: 6 %
NEUTROPHILS # BLD AUTO: 7.9 10E3/UL (ref 1.6–8.3)
NEUTROPHILS NFR BLD AUTO: 69 %
NRBC # BLD AUTO: 0 10E3/UL
NRBC BLD AUTO-RTO: 0 /100
PHOSPHATE SERPL-MCNC: 2.7 MG/DL (ref 2.5–4.5)
PLATELET # BLD AUTO: 240 10E3/UL (ref 150–450)
POTASSIUM BLD-SCNC: 3.4 MMOL/L (ref 3.4–5.3)
PROT SERPL-MCNC: 6.9 G/DL (ref 6.8–8.8)
RBC # BLD AUTO: 4.58 10E6/UL (ref 3.8–5.2)
SARS-COV-2 RNA RESP QL NAA+PROBE: NEGATIVE
SODIUM SERPL-SCNC: 135 MMOL/L (ref 133–144)
WBC # BLD AUTO: 11.6 10E3/UL (ref 4–11)

## 2022-09-15 PROCEDURE — 99152 MOD SED SAME PHYS/QHP 5/>YRS: CPT

## 2022-09-15 PROCEDURE — 999N000154 HC STATISTIC RADIOLOGY XRAY, US, CT, MAR, NM

## 2022-09-15 PROCEDURE — 49406 IMAGE CATH FLUID PERI/RETRO: CPT

## 2022-09-15 PROCEDURE — 87205 SMEAR GRAM STAIN: CPT | Performed by: SURGERY

## 2022-09-15 PROCEDURE — 258N000003 HC RX IP 258 OP 636: Performed by: SURGERY

## 2022-09-15 PROCEDURE — 84100 ASSAY OF PHOSPHORUS: CPT | Performed by: SURGERY

## 2022-09-15 PROCEDURE — 83735 ASSAY OF MAGNESIUM: CPT | Performed by: SURGERY

## 2022-09-15 PROCEDURE — 250N000013 HC RX MED GY IP 250 OP 250 PS 637: Performed by: RADIOLOGY

## 2022-09-15 PROCEDURE — 85014 HEMATOCRIT: CPT | Performed by: SURGERY

## 2022-09-15 PROCEDURE — 84134 ASSAY OF PREALBUMIN: CPT | Performed by: SURGERY

## 2022-09-15 PROCEDURE — C9113 INJ PANTOPRAZOLE SODIUM, VIA: HCPCS | Performed by: SURGERY

## 2022-09-15 PROCEDURE — 250N000009 HC RX 250: Performed by: SURGERY

## 2022-09-15 PROCEDURE — 250N000011 HC RX IP 250 OP 636: Performed by: RADIOLOGY

## 2022-09-15 PROCEDURE — 250N000011 HC RX IP 250 OP 636: Performed by: SURGERY

## 2022-09-15 PROCEDURE — 85730 THROMBOPLASTIN TIME PARTIAL: CPT | Performed by: RADIOLOGY

## 2022-09-15 PROCEDURE — 87075 CULTR BACTERIA EXCEPT BLOOD: CPT | Performed by: SURGERY

## 2022-09-15 PROCEDURE — 120N000001 HC R&B MED SURG/OB

## 2022-09-15 PROCEDURE — 0W9G30Z DRAINAGE OF PERITONEAL CAVITY WITH DRAINAGE DEVICE, PERCUTANEOUS APPROACH: ICD-10-PCS | Performed by: RADIOLOGY

## 2022-09-15 PROCEDURE — 85610 PROTHROMBIN TIME: CPT | Performed by: RADIOLOGY

## 2022-09-15 PROCEDURE — 80053 COMPREHEN METABOLIC PANEL: CPT | Performed by: SURGERY

## 2022-09-15 PROCEDURE — 87070 CULTURE OTHR SPECIMN AEROBIC: CPT | Performed by: SURGERY

## 2022-09-15 PROCEDURE — 36415 COLL VENOUS BLD VENIPUNCTURE: CPT | Performed by: SURGERY

## 2022-09-15 PROCEDURE — 272N000431 CT ABDOMEN PERITONEUM ABSCESS DRAIN W CATH PLACE

## 2022-09-15 PROCEDURE — U0003 INFECTIOUS AGENT DETECTION BY NUCLEIC ACID (DNA OR RNA); SEVERE ACUTE RESPIRATORY SYNDROME CORONAVIRUS 2 (SARS-COV-2) (CORONAVIRUS DISEASE [COVID-19]), AMPLIFIED PROBE TECHNIQUE, MAKING USE OF HIGH THROUGHPUT TECHNOLOGIES AS DESCRIBED BY CMS-2020-01-R: HCPCS | Performed by: SURGERY

## 2022-09-15 RX ORDER — HEPARIN SODIUM 5000 [USP'U]/.5ML
5000 INJECTION, SOLUTION INTRAVENOUS; SUBCUTANEOUS EVERY 12 HOURS
Status: DISCONTINUED | OUTPATIENT
Start: 2022-09-15 | End: 2022-09-18

## 2022-09-15 RX ORDER — NALOXONE HYDROCHLORIDE 0.4 MG/ML
0.4 INJECTION, SOLUTION INTRAMUSCULAR; INTRAVENOUS; SUBCUTANEOUS
Status: DISCONTINUED | OUTPATIENT
Start: 2022-09-15 | End: 2022-09-15

## 2022-09-15 RX ORDER — NALOXONE HYDROCHLORIDE 0.4 MG/ML
0.2 INJECTION, SOLUTION INTRAMUSCULAR; INTRAVENOUS; SUBCUTANEOUS
Status: DISCONTINUED | OUTPATIENT
Start: 2022-09-15 | End: 2022-09-15

## 2022-09-15 RX ORDER — FENTANYL CITRATE 50 UG/ML
25-50 INJECTION, SOLUTION INTRAMUSCULAR; INTRAVENOUS EVERY 5 MIN PRN
Status: DISCONTINUED | OUTPATIENT
Start: 2022-09-15 | End: 2022-09-15

## 2022-09-15 RX ORDER — LIDOCAINE 40 MG/G
CREAM TOPICAL
Status: DISCONTINUED | OUTPATIENT
Start: 2022-09-15 | End: 2022-09-18 | Stop reason: HOSPADM

## 2022-09-15 RX ORDER — METHOCARBAMOL 100 MG/ML
500 INJECTION, SOLUTION INTRAMUSCULAR; INTRAVENOUS EVERY 8 HOURS PRN
Status: DISCONTINUED | OUTPATIENT
Start: 2022-09-15 | End: 2022-09-16

## 2022-09-15 RX ORDER — FLUMAZENIL 0.1 MG/ML
0.2 INJECTION, SOLUTION INTRAVENOUS
Status: DISCONTINUED | OUTPATIENT
Start: 2022-09-15 | End: 2022-09-15

## 2022-09-15 RX ORDER — ACETAMINOPHEN 325 MG/1
650 TABLET ORAL EVERY 4 HOURS PRN
Status: COMPLETED | OUTPATIENT
Start: 2022-09-15 | End: 2022-09-18

## 2022-09-15 RX ORDER — IBUPROFEN 400 MG/1
400 TABLET, FILM COATED ORAL EVERY 6 HOURS PRN
Status: DISCONTINUED | OUTPATIENT
Start: 2022-09-15 | End: 2022-09-15

## 2022-09-15 RX ADMIN — SODIUM CHLORIDE, POTASSIUM CHLORIDE, SODIUM LACTATE AND CALCIUM CHLORIDE: 600; 310; 30; 20 INJECTION, SOLUTION INTRAVENOUS at 20:07

## 2022-09-15 RX ADMIN — METOPROLOL TARTRATE 5 MG: 5 INJECTION INTRAVENOUS at 20:44

## 2022-09-15 RX ADMIN — MIDAZOLAM HYDROCHLORIDE 1 MG: 1 INJECTION, SOLUTION INTRAMUSCULAR; INTRAVENOUS at 09:33

## 2022-09-15 RX ADMIN — ACETAMINOPHEN 650 MG: 325 TABLET ORAL at 17:21

## 2022-09-15 RX ADMIN — PANTOPRAZOLE SODIUM 40 MG: 40 INJECTION, POWDER, FOR SOLUTION INTRAVENOUS at 08:06

## 2022-09-15 RX ADMIN — FENTANYL CITRATE 50 MCG: 50 INJECTION, SOLUTION INTRAMUSCULAR; INTRAVENOUS at 09:33

## 2022-09-15 RX ADMIN — HEPARIN SODIUM 5000 UNITS: 5000 INJECTION, SOLUTION INTRAVENOUS; SUBCUTANEOUS at 20:07

## 2022-09-15 RX ADMIN — ACETAMINOPHEN 650 MG: 325 TABLET ORAL at 11:21

## 2022-09-15 RX ADMIN — PROCHLORPERAZINE EDISYLATE 10 MG: 5 INJECTION INTRAMUSCULAR; INTRAVENOUS at 08:14

## 2022-09-15 ASSESSMENT — ACTIVITIES OF DAILY LIVING (ADL)
ADLS_ACUITY_SCORE: 18

## 2022-09-15 NOTE — PROGRESS NOTES
9/14 1900  9/15 0000  9/15 0400    Observation Goals:  - discharge needs not yet met    Orientation/Cognitive: Alert and oriented x4  Observation Goals (Met/ Not Met): Not met  Mobility Level/Assist Equipment: Independent  Fall Risk (Y/N): No  Behavior Concerns: None  Pain Management: PRN Dilaudid  Tele/VS/O2: BP (!) 169/110 (BP Location: Right arm, Patient Position: Semi-Osborn's)   Pulse 87   Temp 97.9  F (36.6  C) (Oral)   Resp 16   SpO2 97%   ABNL Lab/BG: WBC elv.  Diet: NPO  Bowel/Bladder: Some retention, straight cath 475ml, sticky note for provider left  Skin Concerns: None  Drains/Devices: IVF  Tests/Procedures for next shift: IR Drain placement  Anticipated DC date & active delays: 9/16  Patient Stated Goal for Today: Pain management

## 2022-09-15 NOTE — PROGRESS NOTES
"GENERAL SURGERY PROGRESS NOTE    SUBJECTIVE:  Patient reports persistent nausea and vomiting/reflux intermittently overnight; overall feels slightly better than yesterday with IV fluids overnight. Straight cath overnight for urinary retention-- 475 ml output.    OBJECTIVE:  Vital signs:  Temp: 97.9  F (36.6  C) Temp src: Oral BP: (!) 169/110 Pulse: 87   Resp: 16 SpO2: 97 % O2 Device: None (Room air)        Estimated body mass index is 31.8 kg/m  as calculated from the following:    Height as of 6/29/22: 1.803 m (5' 11\").    Weight as of 9/6/22: 103.4 kg (228 lb).      GENERAL: NAD  RESPIRATORY: No dyspnea  CARDIOVASCULAR: RRR  ABDOMEN: Soft, nondistended, mildly ttp in LUQ/epigastrium without rebound or guarding  EXTREMITIES: No edema or tenderness    PERTINENT LABS or IMAGING:  AM lab work pending    ASSESSMENT:  46 year old female with perigastric fluid collection (thought to be secondary to postoperative hematoma) with dehydration, nausea, vomiting and GERD.    PLAN:  - NPO for procedure today  - Obtain COVID-19 screening PCR for planned intervention with IR  - IR to place percutaneous drain today to drain intra-abdominal perigastric fluid collection  - Pending patient's symptom improvement, may begin clear liquid diet post-procedure  - Will need routine drain care (flush with 10 ml qshift), record output    Dispo: floor    CRISTINA OLIVA MD on 9/15/2022 at 7:35 AM  Minimally Invasive & Bariatric Surgery   [facility]      "

## 2022-09-15 NOTE — PROGRESS NOTES
RADIOLOGY PROCEDURE NOTE  Patient name: Tari Nicolas  MRN: [id number]  : 1975    Pre-procedure diagnosis: Hematoma   Post-procedure diagnosis: Same    Procedure Date/Time: September 15, 2022  10:08 AM  Procedure: Percutaneous Drain Placement  Estimated blood loss: None  Specimen(s) collected with description: 8 mL blood  The patient tolerated the procedure well with no immediate complications.  I determined this patient to be an appropriate candidate for the planned sedation and procedure and reassessed the patient IMMEDIATELY PRIOR to sedation and procedure.    See imaging dictation for procedural details and findings.    Provider name: Trevor Woodruff MD  Assistant(s):None

## 2022-09-15 NOTE — PHARMACY-ADMISSION MEDICATION HISTORY
Pharmacy Medication History  Admission medication history interview status for the 9/14/2022  admission is complete. See EPIC admission navigator for prior to admission medications     Location of Interview: Patient room  Medication history sources: Patient and Surescripts    Significant changes made to the medication list:      In the past week, patient estimated taking medication this percent of the time: greater than 90%    Additional medication history information:       Medication reconciliation completed by provider prior to medication history? No    Time spent in this activity: 5min     Prior to Admission medications    Medication Sig Last Dose Taking? Auth Provider Long Term End Date   acetaminophen (TYLENOL) 325 MG tablet Take 325-650 mg by mouth every 6 hours as needed for mild pain  Yes Unknown, Entered By History     metoclopramide (REGLAN) 5 MG tablet 5 mg by Oral or NG Tube route every 6 hours as needed  Yes Reported, Patient     pantoprazole (PROTONIX) 40 MG EC tablet Take 1 tablet (40 mg) by mouth daily for 30 doses not taking  Davon Jackson MD  10/6/22       The information provided in this note is only as accurate as the sources available at the time of update(s)   Emmy DislaD

## 2022-09-15 NOTE — PROGRESS NOTES
SPIRITUAL HEALTH SERVICES Progress Note  [facility]  Short Stay    PT sleeping following procedure today. Follow-up with PT in the next 24 -48 hours.    [facility]

## 2022-09-15 NOTE — UTILIZATION REVIEW
Admission Status; Secondary Review Determination    Under the authority of the Utilization Management Committee, the utilization review process indicated a secondary review on the above patient. The review outcome is based on review of the medical records, discussions with staff, and applying clinical experience noted on the date of the review.    (x) Inpatient Status Appropriate - This patient's medical care is consistent with medical management for inpatient care and reasonable inpatient medical practice.    RATIONALE FOR DETERMINATION:  Complex 46-year-old female with history of remote ovarian cancer in remission, gallstones, status post gastric sleeve procedure the end of June 2022 followed by new onset of seizures the following day.  Patient then developed abdominal cramping with intermittent vomiting and diarrhea in early September difficulty tolerating oral intake.  CT imaging revealed a prior sleeve gastrectomy with large fluid collection along the greater curvature of the stomach measuring 8.1 cm most consistent with a postoperative hematoma less likely leak or abscess.  Patient had mild leukocytosis, normal electrolytes and after antiemetics, anti-inflammatory agents, IV fluids patient returned home to follow-up with outpatient gastroenterology.  Patient unfortunately has continued to struggle with p.o. intake and now experiencing several episodes of nausea and vomiting and cannot tolerate even small amounts of clear liquids.  Thus it is felt that patient's significant fluid collection is causing extrinsic compression of her gastric remnant with obstructive symptoms.  Inpatient management appropriate for pain control, IV fluids and attempt interventional radiology drainage of the intra-abdominal perigastric fluid collection in hopes of resolving patient's symptoms.    At the time of admission with the information available to the attending physician more than 2 nights Hospital complex care was  anticipated, based on patient risk of adverse outcome if treated as outpatient and complex care required. Inpatient admission is appropriate based on the Medicare guidelines.    This document was produced using voice recognition software    The information on this document is developed by the utilization review team in order for the business office to ensure compliance. This only denotes the appropriateness of proper admission status and does not reflect the quality of care rendered.    The definitions of Inpatient Status and Observation Status used in making the determination above are those provided in the CMS Coverage Manual, Chapter 1 and Chapter 6, section 70.4.    Sincerely,    Calvin Carranza MD  Utilization Review  Physician Advisor  [facility].

## 2022-09-15 NOTE — PLAN OF CARE
Problem: Plan of Care - These are the overarching goals to be used throughout the patient stay.    Goal: Plan of Care Review/Shift Note  Description: The Plan of Care Review/Shift note should be completed every shift.  The Outcome Evaluation is a brief statement about your assessment that the patient is improving, declining, or no change.  This information will be displayed automatically on your shift note.  Outcome: Ongoing, Progressing  Flowsheets (Taken 9/14/2022 2321)  Plan of Care Reviewed With: patient  Overall Patient Progress: no change     Problem: Plan of Care - These are the overarching goals to be used throughout the patient stay.    Goal: Readiness for Transition of Care  Intervention: Mutually Develop Transition Plan  Recent Flowsheet Documentation  Taken 9/14/2022 2300 by Jany Lizama, RN  Equipment Currently Used at Home: none

## 2022-09-16 LAB
ALBUMIN UR-MCNC: NEGATIVE MG/DL
APPEARANCE UR: CLEAR
BILIRUB UR QL STRIP: NEGATIVE
COLOR UR AUTO: ABNORMAL
GLUCOSE BLDC GLUCOMTR-MCNC: 98 MG/DL (ref 70–99)
GLUCOSE UR STRIP-MCNC: NEGATIVE MG/DL
HGB UR QL STRIP: NEGATIVE
KETONES UR STRIP-MCNC: 60 MG/DL
LEUKOCYTE ESTERASE UR QL STRIP: ABNORMAL
MUCOUS THREADS #/AREA URNS LPF: PRESENT /LPF
NITRATE UR QL: NEGATIVE
PH UR STRIP: 7 [PH] (ref 5–7)
PREALB SERPL IA-MCNC: 13 MG/DL (ref 15–45)
RBC URINE: <1 /HPF
SP GR UR STRIP: 1.01 (ref 1–1.03)
SQUAMOUS EPITHELIAL: 2 /HPF
UROBILINOGEN UR STRIP-MCNC: NORMAL MG/DL
WBC URINE: 3 /HPF

## 2022-09-16 PROCEDURE — 81001 URINALYSIS AUTO W/SCOPE: CPT | Performed by: SURGERY

## 2022-09-16 PROCEDURE — 258N000003 HC RX IP 258 OP 636: Performed by: SURGERY

## 2022-09-16 PROCEDURE — 120N000001 HC R&B MED SURG/OB

## 2022-09-16 PROCEDURE — 250N000011 HC RX IP 250 OP 636: Performed by: SURGERY

## 2022-09-16 PROCEDURE — 250N000009 HC RX 250: Performed by: SURGERY

## 2022-09-16 RX ORDER — DEXTROSE MONOHYDRATE, SODIUM CHLORIDE, AND POTASSIUM CHLORIDE 50; 1.49; 4.5 G/1000ML; G/1000ML; G/1000ML
INJECTION, SOLUTION INTRAVENOUS CONTINUOUS
Status: DISCONTINUED | OUTPATIENT
Start: 2022-09-16 | End: 2022-09-19

## 2022-09-16 RX ORDER — METHOCARBAMOL 500 MG/1
500 TABLET, FILM COATED ORAL 3 TIMES DAILY PRN
Status: DISCONTINUED | OUTPATIENT
Start: 2022-09-16 | End: 2022-09-20 | Stop reason: HOSPADM

## 2022-09-16 RX ADMIN — SODIUM CHLORIDE, POTASSIUM CHLORIDE, SODIUM LACTATE AND CALCIUM CHLORIDE: 600; 310; 30; 20 INJECTION, SOLUTION INTRAVENOUS at 09:18

## 2022-09-16 RX ADMIN — HEPARIN SODIUM 5000 UNITS: 5000 INJECTION, SOLUTION INTRAVENOUS; SUBCUTANEOUS at 20:08

## 2022-09-16 RX ADMIN — HEPARIN SODIUM 5000 UNITS: 5000 INJECTION, SOLUTION INTRAVENOUS; SUBCUTANEOUS at 08:19

## 2022-09-16 RX ADMIN — POTASSIUM CHLORIDE, DEXTROSE MONOHYDRATE AND SODIUM CHLORIDE: 150; 5; 450 INJECTION, SOLUTION INTRAVENOUS at 18:33

## 2022-09-16 RX ADMIN — METOPROLOL TARTRATE 5 MG: 5 INJECTION INTRAVENOUS at 20:08

## 2022-09-16 ASSESSMENT — ACTIVITIES OF DAILY LIVING (ADL)
ADLS_ACUITY_SCORE: 20
ADLS_ACUITY_SCORE: 18
ADLS_ACUITY_SCORE: 20
ADLS_ACUITY_SCORE: 18
ADLS_ACUITY_SCORE: 20
ADLS_ACUITY_SCORE: 18

## 2022-09-16 NOTE — PROGRESS NOTES
Orientation/Cognitive: Alert and oriented x4  Observation Goals (Met/ Not Met): In pt  Mobility Level/Assist Equipment: SBA  Fall Risk (Y/N): No  Behavior Concerns: None  Pain Management:Tylenol  Tele/VS/O2: Slightly elevated BP, other VSS on RA.   ABNL Lab/BG: INR 1.19,WBC 11.6,    Diet: Clears  Bowel/Bladder: Continent. Now voiding ok.   Skin Concerns: None  Drains/Devices: IVF infusing as ordered. RUTH drain, flushed as ordered. Scanty out.   Tests/Procedures for next shift: None  Anticipated DC date & active delays: 9/16  Patient Stated Goal for Today: Pain management

## 2022-09-16 NOTE — PROGRESS NOTES
Past Medical History


Past Medical History:  Hypothyroid


Past Surgical History:  Appendectomy, Other


Additional Past Surgical Histo:  NECK SX


Additional Information:  


SMOKES 1 PK/DAY


Alcohol Use:  Occasionally


Drug Use:  None





Adult General


Chief Complaint


Chief Complaint:  NEURO SYMPTOMS/DEFICITS





[facility]


HPI





Patient is a 55  East[]





AS INPATIENT


Admitting Physician:  Joseline Núñez


Condition:  STABLE


Referrals:  


YOAN GUERRA MD (PCP)





Problem Qualifiers








 Additional Impressions:  


 UTI (urinary tract infection)


 Urinary tract infection type:  site unspecified  Hematuria presence:  without 

hematuria  Qualified Codes:  N39.0 - Urinary tract infection, site not specified








ANAYELI HARO Aug 23, 2017 11:15 SPIRITUAL HEALTH SERVICES Progress Note  [facility] [address] [facility] per patient request    Pt Tari was visibly tired throughout visit, so it was kept rather brief. She named a desire for resources related to affording her medical care, which I mentioned to unit SW.     Pt shared that it has been difficult to be away from her  and nine-year-old son. She identified as [employer] and welcomed prayer for recovery.     [facility] will follow up upon patient request for spiritual or emotional support.    Davida Campbell  Chaplain Resident  Spiritual Health Services  Pager: [phone]

## 2022-09-16 NOTE — PROGRESS NOTES
Orientation/Cognitive: A/O x4     Observation Goals (Met/ Not Met): Inpatient     Mobility Level/Assist Equipment: Up w/ SBA    Fall Risk (Y/N): No    Behavior Concerns: None, flat affect     Pain Management: denies pain     Tele/VS/O2:  VSS on RA, ex HTN, prn given w/ effect     ABNL Lab/BG: INR 1.19,WBC 11.6,      Diet: Clear liquid     Bowel/Bladder: Continent, voiding      Skin Concerns: RUTH drain site CDI    Drains/Devices: IVF @ 75/hr. RUTH drain, flushed as ordered.     Tests/Procedures for next shift: None    Anticipated DC date & active delays: 9/16??    Patient Stated Goal for Today: rest

## 2022-09-16 NOTE — PROGRESS NOTES
Interventional Radiology - Progress Note  Inpatient - [facility]  9/16/2022    S:  Minimal discomfort at abdominal drain site. No other complaints at this time.    Culture:    Gram Stain Result  No organisms seen      2+ WBC seen           Cultures NGTD      Flushing: 10mL sterile saline q8h, subtact flush from output totals      O:  BP (!) 167/103   Pulse 83   Temp 97.4  F (36.3  C) (Oral)   Resp 18   SpO2 99%   General:  Stable.  In no acute distress.    Neuro:  A&O x 3. Moves all extremities equally.  Abd: Midline upper abdominal drain with bloody output in collection bulb      Labs (Last four results)  CMPRecent Labs   Lab 09/16/22  0705 09/15/22  2110 09/15/22  0625   POTASSIUM  --   --  3.4   GLC 98 104* 90   BUN  --   --  7   CR  --   --  0.56   GFRESTIMATED  --   --  >90     CBC  Recent Labs   Lab 09/15/22  0625   WBC 11.6*   RBC 4.58   HGB 11.7   HCT 38.2        INR  Recent Labs   Lab 09/15/22  1100   INR 1.19*       Drain Outputs (in mL):  9/15 3   9/16 22                   A:  Perigastric hematoma s/p drain placement 9/15/22    P:    Flush drain as ordered  To follow up as outpatient with Dr Villarreal per chart  Drain discharge instructions placed in chart    Rajiv James PA-C  Interventional Radiology  *[id number]  [phone]      Total time spent on the date of the encounter is 20 minutes, including time spent counseling the patient, performing a medically appropriate evaluation, reviewing prior medical history, ordering medications and tests, documenting clinical information in the medical record, and communication of results.

## 2022-09-16 NOTE — CONSULTS
Writer emailed Mehdi (financial counselor) and let her know that patient doesn't have medical insurance. Asked if she could reach out to patient. Mehdi emailed stating that she would call patient.    KRUNAL Morales

## 2022-09-16 NOTE — PROGRESS NOTES
"GENERAL SURGERY PROGRESS NOTE    SUBJECTIVE:  RUTH drain placed yesterday and flushed per order; today with dark liquid serosanguineous output in bulb and tubing. Patient reports some improvement in nausea and vomiting; tolerated 260 ml of clear liquids yesterday. This morning having some intermittent nausea, dizziness and reports some blurry vision-- she notes anti-emetic medications tend to cause these symptoms. Pain controlled.    OBJECTIVE:  Vital signs:  Temp: 97.4  F (36.3  C) Temp src: Oral BP: (!) 167/103 Pulse: 83   Resp: 18 SpO2: 99 % O2 Device: None (Room air) Oxygen Delivery: 2 LPM      Estimated body mass index is 31.8 kg/m  as calculated from the following:    Height as of 6/29/22: 1.803 m (5' 11\").    Weight as of 9/6/22: 103.4 kg (228 lb).      GENERAL: NAD  RESPIRATORY: No dyspnea  CARDIOVASCULAR: RRR  ABDOMEN: soft, non-distended, appropriately ttp autumn-incisionally; RUTH drain with 3-way stopcock has dark serosanguineous fluid output in tubing and bulb.  EXTREMITIES: No edema or tenderness    PERTINENT LABS or IMAGING:  Perc drain gram stain shows no organisms, culture with NGTD    ASSESSMENT:  46 year old female with perigastric fluid collection (secondary to postoperative hematoma), s/p percutaneous drain placement on 9/15/22 by Interventional Radiology, with nausea, vomiting and GERD. Dehydration resolved.     PLAN:  - Clear liquid diet as tolerated; instructed patient to sip liquids slowly with aim to take in 4 fl oz/hr. Patient will remain on CLD at discharge  - Anti-emetics PRN for nausea and vomiting-- I anticipate these symptoms may persist until entire hematoma is drained  - Will need routine drain care (flush with 10 ml saline q8h, with 5 ml toward patient and 5 ml toward RUTH bulb), record output daily. Drain teaching today in anticipation of discharge today or tomorrow. Patient will keep drain at discharge and will follow up as outpatient with Dr. Villarreal at [facility]    Dispo: " floor; discharge pending improvement in PO intake    CRISTINA OLIVA MD on 9/16/2022 at 8:54 AM  Minimally Invasive & Bariatric Surgery   [facility]

## 2022-09-16 NOTE — PLAN OF CARE
Orientation/Cognitive: A&O  Observation Goals (Met/ Not Met): Inp  Mobility Level/Assist Equipment: SBA, IV pole. Dizzy at times  Fall Risk (Y/N): Y  Behavior Concerns: N  Pain Management: denies  Tele/VS/O2: htn at times. Metoprolol prn avail, not given. RA.  ABNL Lab/BG: UA collected. INR  Diet: clear liq. Not tolerating. NV, interm emesis. Refusing protonix and antiemetics rt blurry vision, provider aware. Encouragement provided several times.   Bowel/Bladder: Cont. RUTH drain, prior sleeve gastrectomy likely postop hematoma, irrigated and stripped, dark red. RUTH education provided.   Skin Concerns:   Drains/Devices:  site cdi. IV inf  Tests/Procedures for next shift:   Anticipated DC date & active delays: 9-17   Patient Stated Goal for Today: rest. Feel better.

## 2022-09-17 ENCOUNTER — ANESTHESIA EVENT (OUTPATIENT)
Dept: SURGERY | Facility: CLINIC | Age: 47
DRG: 908 | End: 2022-09-17

## 2022-09-17 PROCEDURE — 250N000013 HC RX MED GY IP 250 OP 250 PS 637: Performed by: RADIOLOGY

## 2022-09-17 PROCEDURE — 99221 1ST HOSP IP/OBS SF/LOW 40: CPT | Performed by: NURSE PRACTITIONER

## 2022-09-17 PROCEDURE — 250N000011 HC RX IP 250 OP 636: Performed by: NURSE PRACTITIONER

## 2022-09-17 PROCEDURE — 250N000013 HC RX MED GY IP 250 OP 250 PS 637: Performed by: NURSE PRACTITIONER

## 2022-09-17 PROCEDURE — 250N000011 HC RX IP 250 OP 636: Performed by: SURGERY

## 2022-09-17 PROCEDURE — 250N000009 HC RX 250: Performed by: SURGERY

## 2022-09-17 PROCEDURE — 258N000003 HC RX IP 258 OP 636: Performed by: SURGERY

## 2022-09-17 PROCEDURE — 120N000001 HC R&B MED SURG/OB

## 2022-09-17 RX ORDER — HYDRALAZINE HYDROCHLORIDE 20 MG/ML
10 INJECTION INTRAMUSCULAR; INTRAVENOUS EVERY 6 HOURS PRN
Status: DISCONTINUED | OUTPATIENT
Start: 2022-09-17 | End: 2022-09-18

## 2022-09-17 RX ORDER — AMLODIPINE BESYLATE 10 MG/1
10 TABLET ORAL DAILY
Status: DISCONTINUED | OUTPATIENT
Start: 2022-09-17 | End: 2022-09-18

## 2022-09-17 RX ADMIN — HEPARIN SODIUM 5000 UNITS: 5000 INJECTION, SOLUTION INTRAVENOUS; SUBCUTANEOUS at 08:59

## 2022-09-17 RX ADMIN — HEPARIN SODIUM 5000 UNITS: 5000 INJECTION, SOLUTION INTRAVENOUS; SUBCUTANEOUS at 20:02

## 2022-09-17 RX ADMIN — HYDRALAZINE HYDROCHLORIDE 10 MG: 20 INJECTION INTRAMUSCULAR; INTRAVENOUS at 12:17

## 2022-09-17 RX ADMIN — ACETAMINOPHEN 650 MG: 325 TABLET ORAL at 21:21

## 2022-09-17 RX ADMIN — METOPROLOL TARTRATE 5 MG: 5 INJECTION INTRAVENOUS at 01:47

## 2022-09-17 RX ADMIN — POTASSIUM CHLORIDE, DEXTROSE MONOHYDRATE AND SODIUM CHLORIDE: 150; 5; 450 INJECTION, SOLUTION INTRAVENOUS at 17:16

## 2022-09-17 RX ADMIN — METOPROLOL TARTRATE 5 MG: 5 INJECTION INTRAVENOUS at 08:53

## 2022-09-17 RX ADMIN — FAMOTIDINE 20 MG: 10 INJECTION INTRAVENOUS at 20:02

## 2022-09-17 RX ADMIN — AMLODIPINE BESYLATE 10 MG: 10 TABLET ORAL at 09:59

## 2022-09-17 RX ADMIN — FAMOTIDINE 20 MG: 10 INJECTION INTRAVENOUS at 09:42

## 2022-09-17 ASSESSMENT — ACTIVITIES OF DAILY LIVING (ADL)
ADLS_ACUITY_SCORE: 20
ADLS_ACUITY_SCORE: 22
ADLS_ACUITY_SCORE: 20
ADLS_ACUITY_SCORE: 20
ADLS_ACUITY_SCORE: 22
ADLS_ACUITY_SCORE: 20

## 2022-09-17 ASSESSMENT — COPD QUESTIONNAIRES: COPD: 0

## 2022-09-17 ASSESSMENT — LIFESTYLE VARIABLES: TOBACCO_USE: 0

## 2022-09-17 NOTE — PLAN OF CARE
Goal Outcome Evaluation:  Orientation/Cognitive: A&OX4  Observation Goals (Met/ Not Met): NA, inpt  Mobility Level/Assist Equipment: SBA  Fall Risk (Y/N): yes  Behavior Concerns: None, calm and cooperative  Pain Management: Denies pain, nauseous but declining antiemetic as pt still with blurry vision, continues to decline Protonix as blurry vision listed as one of the side effects  Tele/VS/O2: VSS on RA except for high BP, PRN metoprolol given with little improvement  ABNL Lab/BG: INR of 1.19, Alb of 3.3, ketones noted in urine  Diet: Clear liquid, pt nauseous, poor oral intake, refusing to take antiemetics  Bowel/Bladder: Continent  Skin Concerns: None, abd RUTH drain in dark red drainage but minimal amount  Drains/Devices: IVF at 50mL/hr  Tests/Procedures for next shift: None  Anticipated DC date & active delays: tomorrow if able to tolerate clear liquids  Patient Stated Goal for Today: To rest

## 2022-09-17 NOTE — PROGRESS NOTES
"GENERAL SURGERY PROGRESS NOTE    SUBJECTIVE:  Patient reports ongoing blurry vision, nausea and vomiting-- reports small volume emesis x9 yesterday. Patient refusing antiemetic medications and PPI therapy given possible side effects of blurry vision. Tried drinking water and jello, able to keep very small amount down. RUTH drain with dark serosanguineous output, 22 ml over past 24 hrs recorded with 10 ml q8h flushes.    OBJECTIVE:  Vital signs:  Temp: 98.3  F (36.8  C) Temp src: Oral BP: (!) 178/106 Pulse: 81   Resp: 18 SpO2: 100 % O2 Device: None (Room air) Oxygen Delivery: 2 LPM      Estimated body mass index is 31.8 kg/m  as calculated from the following:    Height as of 6/29/22: 1.803 m (5' 11\").    Weight as of 9/6/22: 103.4 kg (228 lb).    Physical Examination:  GENERAL: NAD, appears tired  RESPIRATORY: No dyspnea  CARDIOVASCULAR: RRR  ABDOMEN: soft, nondistended, mildly ttp in LUQ epigastric region; RUTH drain in place with dark serosanguineous output  EXTREMITIES: No edema or tenderness    PERTINENT LABS or IMAGING:  Prealbumin - 13. Percutaneous drain fluid culture NGTD.    ASSESSMENT:  46 year old female with perigastric fluid collection (secondary to postoperative hematoma), s/p percutaneous drain placement on 9/15/22 by Interventional Radiology, with ongoing nausea, vomiting, GERD and PO intolerance. Dehydration resolved. Persistent hypertension during inpatient admission.    PLAN:  - Patient with ongoing PO intolerance despite percutaneous drain placement; she was able to sip on very small amount of clear liquids yesterday but continues to have emesis frequently. RUTH drain with minimal output despite routine drain flushing and stripping for the past 2 days. I discussed with the patient that the drain is not effectively draining the hematoma and given her ongoing difficulty with PO intake, I would recommend diagnostic laparoscopy with drainage of intra-abdominal hematoma as the next step in management in an " effort to alleviate her symptoms and help improve PO intake. I discussed the risks of surgical intervention including risks of bleeding, infection, injury to surrounding/intra-abdominal tissues, venous thromboembolic risks, and risks associated with general anesthesia. I also discussed that the goal of surgical therapy would be to more effectively drain the perigastric collection/hematoma and may not lead to complete relief of all symptoms such as GERD, but I do believe operative drainage will be more effective at this point than continuing with the percutaneous drain given minimal output over the past few days. The patient is agreeable to proceed with operative intervention. Will plan to proceed tomorrow for above intervention  - Patient still complains of blurry vision despite declining antiemetics and PPI therapy; will switch to IV famotidine for better reflux control. Her blurry vision may be secondary to persistent hypertension, as BP has been in 170s/100s range during her hospitalization despite IV metoprolol PRN-- will consult with hospitalist for hypertension control while inpatient. Appreciate recommendations  - Continue IVFs for hydration  - NPO after midnight in anticipation of surgery tomorrow    Dispo: Floor    CRISTINA OLIVA MD on 9/17/2022 at 9:11 AM  Minimally Invasive & Bariatric Surgery   [facility]

## 2022-09-17 NOTE — ANESTHESIA PREPROCEDURE EVALUATION
Anesthesia Pre-Procedure Evaluation    Patient: Tari Nicolas   MRN: [id number] : 1975        Procedure : Procedure(s):  LAPAROSCOPY  DRAINAGE INTRA ABDOMINAL HEMATOMA          Past Medical History:   Diagnosis Date     Cancer (H)     Ovarian cancer      Past Surgical History:   Procedure Laterality Date     CHOLECYSTECTOMY      Laparoscopic     GASTRECTOMY LAPAROSCOPIC SLEEVE      2022     GYN SURGERY      Laparoscopic EVAN with BSO      Allergies   Allergen Reactions     Ciprofloxacin Hives     Hydrocodone Dizziness     Oxycodone Dizziness      Social History     Tobacco Use     Smoking status: Never Smoker     Smokeless tobacco: Never Used   Substance Use Topics     Alcohol use: No      Wt Readings from Last 1 Encounters:   22 103.4 kg (228 lb)        Anesthesia Evaluation   Pt has had prior anesthetic. Type: General.    No history of anesthetic complications       ROS/MED HX  ENT/Pulmonary: Comment: Blurry vision   (-) tobacco use, asthma, COPD and sleep apnea   Neurologic:  - neg neurologic ROS     Cardiovascular:     (+) hypertension----- (-) CAD   METS/Exercise Tolerance:     Hematologic:  - neg hematologic  ROS     Musculoskeletal:  - neg musculoskeletal ROS     GI/Hepatic: Comment: Gastric outlet obstruction  Hx of gastric sleeve    (+) GERD,  (-) liver disease   Renal/Genitourinary:    (-) renal disease   Endo:    (-) Type I DM and Type II DM   Psychiatric/Substance Use:       Infectious Disease:       Malignancy:   (+) Malignancy, History of Other.Other CA ovarian cancer Remission status post Surgery.    Other:            Physical Exam    Airway        Mallampati: III   TM distance: > 3 FB   Neck ROM: full     Respiratory Devices and Support         Dental  no notable dental history         Cardiovascular   cardiovascular exam normal          Pulmonary   pulmonary exam normal                OUTSIDE LABS:  CBC:   Lab Results   Component Value Date    WBC 11.6 (H) 09/15/2022    WBC 12.5  (H) 09/06/2022    HGB 11.7 09/15/2022    HGB 13.3 09/06/2022    HCT 38.2 09/15/2022    HCT 43.2 09/06/2022     09/15/2022     09/06/2022     BMP:   Lab Results   Component Value Date     09/15/2022     09/06/2022    POTASSIUM 3.4 09/15/2022    POTASSIUM 4.0 09/06/2022    CHLORIDE 101 09/15/2022    CHLORIDE 101 09/06/2022    CO2 24 09/15/2022    CO2 25 09/06/2022    BUN 7 09/15/2022    BUN 9 09/06/2022    CR 0.56 09/15/2022    CR 0.59 09/06/2022    GLC 98 09/16/2022     (H) 09/15/2022     COAGS:   Lab Results   Component Value Date    PTT 29 09/15/2022    INR 1.19 (H) 09/15/2022     POC:   Lab Results   Component Value Date    HCG Negative 02/05/2018     HEPATIC:   Lab Results   Component Value Date    ALBUMIN 3.2 (L) 09/15/2022    PROTTOTAL 6.9 09/15/2022    ALT 22 09/15/2022    AST 14 09/15/2022    ALKPHOS 84 09/15/2022    BILITOTAL 1.3 09/15/2022     OTHER:   Lab Results   Component Value Date    LACT 2.0 06/29/2022    MAHAD 9.3 09/15/2022    PHOS 2.7 09/15/2022    MAG 1.9 09/15/2022    LIPASE 123 09/06/2022       Anesthesia Plan    ASA Status:  2   NPO Status:  NPO Appropriate    Anesthesia Type: General.     - Airway: ETT   Induction: Intravenous, RSI.   Maintenance: Balanced.        Consents    Anesthesia Plan(s) and associated risks, benefits, and realistic alternatives discussed. Questions answered and patient/representative(s) expressed understanding.    - Discussed:     - Discussed with:  Patient      - Extended Intubation/Ventilatory Support Discussed: No.      - Patient is DNR/DNI Status: No    Use of blood products discussed: Yes.     - Discussed with: Patient.     - Consented: consented to blood products            Reason for refusal: other.     Postoperative Care    Pain management: IV analgesics, Multi-modal analgesia.     - Plan for long acting post-op opioid use   PONV prophylaxis: Ondansetron (or other 5HT-3), Dexamethasone or Solumedrol     Comments:    Other  Comments: No NG without surgeon guidance            Venkata Vo MD

## 2022-09-17 NOTE — PLAN OF CARE
Orientation/Cognitive: A&O x 4  Observation Goals (Met/ Not Met): N/A  Mobility Level/Assist Equipment: SBA, IV pole. Dizzy at times  Fall Risk (Y/N): Y  Behavior Concerns: N  Pain Management: Denies  Tele/VS/O2: htn at times. PRN IV Metoprolol administered x 1 for HTN  ABNL Lab/BG: UA collected. INR  Diet: Clear liq. Scant nausea. Refusing protonix and antiemetics rt blurry vision, provider aware. Encouragement provided several times.   Bowel/Bladder: Cont. RUTH drain, prior sleeve gastrectomy likely postop hematoma, irrigated and stripped, dark red. RUTH education provided.   Skin Concerns:   Drains/Devices: RUTH site is CDI. IVF infusing  Tests/Procedures for next shift: N  Anticipated DC date & active delays: 9-17   Patient Stated Goal for Today: Rest. Feel better.

## 2022-09-17 NOTE — PLAN OF CARE
Orientation/Cognitive: A&O flat affect  Observation Goals (Met/ Not Met): Inpatient  Mobility Level/Assist Equipment: SBA  Fall Risk (Y/N): Yes  Behavior Concerns: No  Pain Management: Denies pain  Tele/VS/O2: VSS on RA ex htn  ABNL Lab/BG:   Diet: Clears. NPO 0000  Bowel/Bladder: Continent  Skin Concerns: RUTH irrigated. Dark red output.   Drains/Devices: RUTH drain; IVF infusing  Tests/Procedures for next shift: diagnostic laparoscopy tomorrow am  Anticipated DC date & active delays:   Patient Stated Goal for Today:

## 2022-09-17 NOTE — PROGRESS NOTES
Charge RN was notified by bedside RN that pt's son was planning to stay overnight.  Pt is a minor-aged 91/2 years old.  Pt's  initially unable to take son home due to being at work till 5am.  Spoke to ANS.  Again spoke to pt and protocol.  Pt was able to get  to come and  son

## 2022-09-17 NOTE — PLAN OF CARE
Orientation/Cognitive: A&O flat affect  Observation Goals (Met/ Not Met): Inp  Mobility Level/Assist Equipment: SBA IV pole  Fall Risk (Y/N): Y  Behavior Concerns: N  Pain Management: denies  Tele/VS/O2: HTN prn meds given x2  ABNL Lab/BG: UA INR  Diet: clear liq. NV not tolerating. Encouragement provided. NPO 0000  Bowel/Bladder: cont  Skin Concerns: RUTH irrigated. Dark red output.   Drains/Devices: RUTH  Tests/Procedures for next shift: diagnostic laparoscopy tomorrow am  Anticipated DC date & active delays: TBD  Patient Stated Goal for Today: rest

## 2022-09-17 NOTE — CONSULTS
[facility]    History and Physical - Hospitalist Service       Date of Admission:  9/14/2022    Assessment & Plan      HTN  -started on Norvasc 10 mg po daily  -hydralazine prn     Blurry vision  -Likely secondary to HTN    Dehydration  -General surgery managing     Nausea/vomiting  -General surgery managing     Perigastric intra-abdominal fluid collection, thought to be secondary to postoperative hematoma  -General surgery managing  -likely surgery procedure tomorrow     Gastric outlet obstruction secondary to perigastric fluid collection  -General surgery managing  -likely surgery procedure tomorrow     Diet: Clear Liquid Diet    DVT Prophylaxis: Heparin SQ  Zavaleta Catheter: Not present  Central Lines: None  Cardiac Monitoring: None  Code Status: Full Code      Clinically Significant Risk Factors Present on Admission                     Disposition Plan  possible surgery tomorrow      Expected Discharge Date: 09/17/2022,  9:00 AM      Discharge Comments: RUTH drain in place.  Financial counseling involved.        The patient's care was discussed with the Attending Physician, Dr. Vazquez.    Lazaro Miguel, CNP  Hospitalist Service  [facility]  Securely message with the Vocera Web Console (learn more here)  Text page via [facility] Paging/Directory         ______________________________________________________________________    Chief Complaint   HTN and blurry vision     History is obtained from the patient    History of Present Illness   Tari Nicolas is a 46 year old female admitted on 9/14/2022 for dehydration, nausea, vomiting, GERD, p.o. intolerance, and suspected gastric outlet obstruction secondary to perigastric fluid collection. The patient has a 2-week history of progressively worsening nausea, vomiting, GERD, and p.o. intolerance; she has a history of gastric surgery in June 2022 and her postoperative course was complicated by acute blood loss anemia which  was managed conservatively with iron supplementation and has since corrected. She was progressing well with her postoperative diet, and at her 6-week follow-up appointment was tolerating solid foods with minimal heartburn symptoms. However, she began experiencing worsening nausea, vomiting, and acid reflux; she presented to [facility] ER last week and underwent CT imaging that demonstrated evidence of a perigastric intra-abdominal fluid collection which is thought to be secondary to a resolving postoperative hematoma.  Since her ER discharge, she has continued to struggle with p.o. intake and now experiences several episodes of nausea, nonbloody/nonbilious vomiting, and is unable to tolerate even small amounts of clear liquids.  Given her symptoms, she return to clinic earlier today and was given 2 L of crystalloid IV fluids to assist with hydration.  I reviewed her CT imaging with her and given her persistent symptoms, recommend percutaneous drainage of the fluid collection in an attempt to reduce extrinsic compression of her gastric remnant and alleviate her symptoms.  The patient currently denies chest pain, shortness of breath, fevers, chills diarrhea, constipation, or bloody stools; she does report severe nausea, vomiting, and inability to keep down clear liquids.    Patient BP systolic in the 170's and diastolic over 100 continuously since 9/15/22. Patient states she has no history of hypertension. Blurry vision is likely secondary to high blood pressure.     Review of Systems    Constitutional: Positive for appetite change and fatigue. Negative for chills and fever.   HENT: Negative for congestion and sore throat.    Respiratory: Negative for apnea, cough, chest tightness, shortness of breath and wheezing.    Cardiovascular: Negative for chest pain, palpitations and leg swelling.   Gastrointestinal: Positive for abdominal pain, nausea and vomiting.   Genitourinary: Negative for difficulty  urinating and flank pain.   Musculoskeletal: Negative for arthralgias, back pain and neck pain.   Skin: Negative for color change, rash and wound.   Neurological: Positive for weakness and headaches. Negative for seizures, syncope and numbness.   Psychiatric/Behavioral: Negative for agitation, confusion and hallucinations    Past Medical History    I have reviewed this patient's medical history and updated it with pertinent information if needed.   Past Medical History:   Diagnosis Date     Cancer (H)     Ovarian cancer       Past Surgical History   I have reviewed this patient's surgical history and updated it with pertinent information if needed.  Past Surgical History:   Procedure Laterality Date     CHOLECYSTECTOMY      Laparoscopic     GASTRECTOMY LAPAROSCOPIC SLEEVE      2022     GYN SURGERY      Laparoscopic EVAN with BSO       Social History   I have reviewed this patient's social history and updated it with pertinent information if needed.  Social History     Tobacco Use     Smoking status: Never Smoker     Smokeless tobacco: Never Used   Substance Use Topics     Alcohol use: No     Drug use: Never       Family History   I have reviewed this patient's family history and updated it with pertinent information if needed.  Family History   Problem Relation Age of Onset     Hypertension Mother      Breast Cancer Other        Prior to Admission Medications   Prior to Admission Medications   Prescriptions Last Dose Informant Patient Reported? Taking?   acetaminophen (TYLENOL) 325 MG tablet  Self Yes Yes   Sig: Take 325-650 mg by mouth every 6 hours as needed for mild pain   metoclopramide (REGLAN) 5 MG tablet  Self Yes Yes   Si mg by Oral or NG Tube route every 6 hours as needed   pantoprazole (PROTONIX) 40 MG EC tablet not taking Self No No   Sig: Take 1 tablet (40 mg) by mouth daily for 30 doses      Facility-Administered Medications: None     Allergies   Allergies   Allergen Reactions     Ciprofloxacin  Hives     Hydrocodone Dizziness     Oxycodone Dizziness       Physical Exam   Vital Signs: Temp: 98.3  F (36.8  C) Temp src: Oral BP: (!) 178/106 Pulse: 81   Resp: 18 SpO2: 100 % O2 Device: None (Room air)    Weight: 0 lbs 0 oz    Constitutional:       Appearance: She is ill-appearing.   HENT:      Head: Normocephalic and atraumatic.      Nose: Nose normal. No congestion.      Mouth/Throat:      Mouth: Mucous membranes are dry.      Pharynx: Oropharynx is clear.   Eyes:      Extraocular Movements: Extraocular movements intact.      Conjunctiva/sclera: Conjunctivae normal.      Pupils: Pupils are equal, round, and reactive to light.   Cardiovascular:      Rate and Rhythm: Regular rhythm.  present.   Pulmonary:      Effort: Pulmonary effort is normal. No respiratory distress.      Breath sounds: Normal breath sounds. No stridor. No wheezing or rales.   Abdominal:      General: Abdomen is flat. There is no distension.      Palpations: Abdomen is soft.      Tenderness: There is abdominal tenderness (in the LUQ and epigastrium). There is no guarding or rebound.   Musculoskeletal:         General: No swelling or deformity. Normal range of motion.      Cervical back: Normal range of motion and neck supple.      Right lower leg: No edema.      Left lower leg: No edema.   Skin:     General: Skin is warm and dry.      Coloration: Skin is not jaundiced.   Neurological:      General: No focal deficit present.      Mental Status: She is alert and oriented to person, place, and time.      Sensory: No sensory deficit.      Gait: Gait normal.   Psychiatric:         Mood and Affect: Mood normal.         Behavior: Behavior normal.         Thought Content: Thought content normal.        Data   Data reviewed today: I reviewed all medications, new labs and imaging results over the last 24 hours.    Recent Labs   Lab 09/16/22  0705 09/15/22  2110 09/15/22  1100 09/15/22  0625   WBC  --   --   --  11.6*   HGB  --   --   --  11.7   MCV  --    --   --  83   PLT  --   --   --  240   INR  --   --  1.19*  --    NA  --   --   --  135   POTASSIUM  --   --   --  3.4   CHLORIDE  --   --   --  101   CO2  --   --   --  24   BUN  --   --   --  7   CR  --   --   --  0.56   ANIONGAP  --   --   --  10   MAHAD  --   --   --  9.3   GLC 98 104*  --  90   ALBUMIN  --   --   --  3.2*   PROTTOTAL  --   --   --  6.9   BILITOTAL  --   --   --  1.3   ALKPHOS  --   --   --  84   ALT  --   --   --  22   AST  --   --   --  14     No results found for this or any previous visit (from the past 24 hour(s)).

## 2022-09-17 NOTE — PROGRESS NOTES
MD Notification    Notified Person: MD    Notified Person Name: Dr. Palacios    Notification Date/Time: 09/17/22 at 0310    Notification Interaction: Page     Purpose of Notification: /107 post 5 mg IV Metoprolol. Asymptomatic.    Orders Received:    Comments: Not followed by Hospitalist group. Will connect with surgery group and cont to monitor.

## 2022-09-18 ENCOUNTER — ANESTHESIA (OUTPATIENT)
Dept: SURGERY | Facility: CLINIC | Age: 47
DRG: 908 | End: 2022-09-18

## 2022-09-18 ENCOUNTER — APPOINTMENT (OUTPATIENT)
Dept: CT IMAGING | Facility: CLINIC | Age: 47
DRG: 908 | End: 2022-09-18
Attending: INTERNAL MEDICINE

## 2022-09-18 LAB — PLATELET # BLD AUTO: 213 10E3/UL (ref 150–450)

## 2022-09-18 PROCEDURE — 258N000003 HC RX IP 258 OP 636: Performed by: NURSE ANESTHETIST, CERTIFIED REGISTERED

## 2022-09-18 PROCEDURE — 250N000011 HC RX IP 250 OP 636: Performed by: SURGERY

## 2022-09-18 PROCEDURE — 250N000011 HC RX IP 250 OP 636: Performed by: NURSE ANESTHETIST, CERTIFIED REGISTERED

## 2022-09-18 PROCEDURE — 120N000001 HC R&B MED SURG/OB

## 2022-09-18 PROCEDURE — 710N000009 HC RECOVERY PHASE 1, LEVEL 1, PER MIN: Performed by: SURGERY

## 2022-09-18 PROCEDURE — 258N000003 HC RX IP 258 OP 636: Performed by: ANESTHESIOLOGY

## 2022-09-18 PROCEDURE — 999N000141 HC STATISTIC PRE-PROCEDURE NURSING ASSESSMENT: Performed by: SURGERY

## 2022-09-18 PROCEDURE — 258N000001 HC RX 258: Performed by: SURGERY

## 2022-09-18 PROCEDURE — 250N000013 HC RX MED GY IP 250 OP 250 PS 637: Performed by: SURGERY

## 2022-09-18 PROCEDURE — 250N000009 HC RX 250: Performed by: SURGERY

## 2022-09-18 PROCEDURE — 370N000017 HC ANESTHESIA TECHNICAL FEE, PER MIN: Performed by: SURGERY

## 2022-09-18 PROCEDURE — 250N000011 HC RX IP 250 OP 636: Performed by: NURSE PRACTITIONER

## 2022-09-18 PROCEDURE — 85049 AUTOMATED PLATELET COUNT: CPT | Performed by: SURGERY

## 2022-09-18 PROCEDURE — 0W9G4ZZ DRAINAGE OF PERITONEAL CAVITY, PERCUTANEOUS ENDOSCOPIC APPROACH: ICD-10-PCS | Performed by: SURGERY

## 2022-09-18 PROCEDURE — 99233 SBSQ HOSP IP/OBS HIGH 50: CPT | Performed by: INTERNAL MEDICINE

## 2022-09-18 PROCEDURE — 272N000001 HC OR GENERAL SUPPLY STERILE: Performed by: SURGERY

## 2022-09-18 PROCEDURE — 360N000076 HC SURGERY LEVEL 3, PER MIN: Performed by: SURGERY

## 2022-09-18 PROCEDURE — 250N000025 HC SEVOFLURANE, PER MIN: Performed by: SURGERY

## 2022-09-18 PROCEDURE — 258N000003 HC RX IP 258 OP 636: Performed by: SURGERY

## 2022-09-18 PROCEDURE — 250N000009 HC RX 250: Performed by: NURSE ANESTHETIST, CERTIFIED REGISTERED

## 2022-09-18 PROCEDURE — 36415 COLL VENOUS BLD VENIPUNCTURE: CPT | Performed by: SURGERY

## 2022-09-18 PROCEDURE — 250N000011 HC RX IP 250 OP 636: Performed by: INTERNAL MEDICINE

## 2022-09-18 PROCEDURE — 70450 CT HEAD/BRAIN W/O DYE: CPT

## 2022-09-18 RX ORDER — AMOXICILLIN 250 MG
1 CAPSULE ORAL 2 TIMES DAILY
Status: DISCONTINUED | OUTPATIENT
Start: 2022-09-18 | End: 2022-09-20 | Stop reason: HOSPADM

## 2022-09-18 RX ORDER — ONDANSETRON 2 MG/ML
INJECTION INTRAMUSCULAR; INTRAVENOUS PRN
Status: DISCONTINUED | OUTPATIENT
Start: 2022-09-18 | End: 2022-09-18

## 2022-09-18 RX ORDER — MAGNESIUM HYDROXIDE 1200 MG/15ML
LIQUID ORAL PRN
Status: DISCONTINUED | OUTPATIENT
Start: 2022-09-18 | End: 2022-09-18 | Stop reason: HOSPADM

## 2022-09-18 RX ORDER — BUPIVACAINE HYDROCHLORIDE AND EPINEPHRINE 2.5; 5 MG/ML; UG/ML
INJECTION, SOLUTION INFILTRATION; PERINEURAL PRN
Status: DISCONTINUED | OUTPATIENT
Start: 2022-09-18 | End: 2022-09-18 | Stop reason: HOSPADM

## 2022-09-18 RX ORDER — CEFAZOLIN SODIUM/WATER 2 G/20 ML
2 SYRINGE (ML) INTRAVENOUS
Status: COMPLETED | OUTPATIENT
Start: 2022-09-18 | End: 2022-09-18

## 2022-09-18 RX ORDER — SODIUM CHLORIDE, SODIUM LACTATE, POTASSIUM CHLORIDE, CALCIUM CHLORIDE 600; 310; 30; 20 MG/100ML; MG/100ML; MG/100ML; MG/100ML
INJECTION, SOLUTION INTRAVENOUS CONTINUOUS
Status: DISCONTINUED | OUTPATIENT
Start: 2022-09-18 | End: 2022-09-18

## 2022-09-18 RX ORDER — PROPOFOL 10 MG/ML
INJECTION, EMULSION INTRAVENOUS CONTINUOUS PRN
Status: DISCONTINUED | OUTPATIENT
Start: 2022-09-18 | End: 2022-09-18

## 2022-09-18 RX ORDER — LIDOCAINE HYDROCHLORIDE 20 MG/ML
INJECTION, SOLUTION INFILTRATION; PERINEURAL PRN
Status: DISCONTINUED | OUTPATIENT
Start: 2022-09-18 | End: 2022-09-18

## 2022-09-18 RX ORDER — PROPOFOL 10 MG/ML
INJECTION, EMULSION INTRAVENOUS PRN
Status: DISCONTINUED | OUTPATIENT
Start: 2022-09-18 | End: 2022-09-18

## 2022-09-18 RX ORDER — TRAMADOL HYDROCHLORIDE 50 MG/1
50 TABLET ORAL EVERY 6 HOURS PRN
Status: DISCONTINUED | OUTPATIENT
Start: 2022-09-18 | End: 2022-09-20 | Stop reason: HOSPADM

## 2022-09-18 RX ORDER — LIDOCAINE 40 MG/G
CREAM TOPICAL
Status: DISCONTINUED | OUTPATIENT
Start: 2022-09-18 | End: 2022-09-20 | Stop reason: HOSPADM

## 2022-09-18 RX ORDER — LABETALOL 20 MG/4 ML (5 MG/ML) INTRAVENOUS SYRINGE
PRN
Status: DISCONTINUED | OUTPATIENT
Start: 2022-09-18 | End: 2022-09-18

## 2022-09-18 RX ORDER — EPHEDRINE SULFATE 50 MG/ML
INJECTION, SOLUTION INTRAMUSCULAR; INTRAVENOUS; SUBCUTANEOUS PRN
Status: DISCONTINUED | OUTPATIENT
Start: 2022-09-18 | End: 2022-09-18

## 2022-09-18 RX ORDER — BISACODYL 10 MG
10 SUPPOSITORY, RECTAL RECTAL DAILY PRN
Status: DISCONTINUED | OUTPATIENT
Start: 2022-09-18 | End: 2022-09-20 | Stop reason: HOSPADM

## 2022-09-18 RX ORDER — LABETALOL HYDROCHLORIDE 5 MG/ML
20 INJECTION, SOLUTION INTRAVENOUS ONCE
Status: COMPLETED | OUTPATIENT
Start: 2022-09-18 | End: 2022-09-18

## 2022-09-18 RX ORDER — FENTANYL CITRATE 50 UG/ML
INJECTION, SOLUTION INTRAMUSCULAR; INTRAVENOUS PRN
Status: DISCONTINUED | OUTPATIENT
Start: 2022-09-18 | End: 2022-09-18

## 2022-09-18 RX ORDER — METOPROLOL TARTRATE 1 MG/ML
2.5 INJECTION, SOLUTION INTRAVENOUS EVERY 4 HOURS PRN
Status: DISCONTINUED | OUTPATIENT
Start: 2022-09-18 | End: 2022-09-18

## 2022-09-18 RX ORDER — KETOROLAC TROMETHAMINE 15 MG/ML
15 INJECTION, SOLUTION INTRAMUSCULAR; INTRAVENOUS EVERY 6 HOURS
Status: COMPLETED | OUTPATIENT
Start: 2022-09-18 | End: 2022-09-19

## 2022-09-18 RX ORDER — POLYETHYLENE GLYCOL 3350 17 G/17G
17 POWDER, FOR SOLUTION ORAL DAILY
Status: DISCONTINUED | OUTPATIENT
Start: 2022-09-19 | End: 2022-09-20 | Stop reason: HOSPADM

## 2022-09-18 RX ORDER — HYDRALAZINE HYDROCHLORIDE 20 MG/ML
10 INJECTION INTRAMUSCULAR; INTRAVENOUS EVERY 6 HOURS PRN
Status: DISCONTINUED | OUTPATIENT
Start: 2022-09-18 | End: 2022-09-20 | Stop reason: HOSPADM

## 2022-09-18 RX ORDER — DEXAMETHASONE SODIUM PHOSPHATE 4 MG/ML
INJECTION, SOLUTION INTRA-ARTICULAR; INTRALESIONAL; INTRAMUSCULAR; INTRAVENOUS; SOFT TISSUE PRN
Status: DISCONTINUED | OUTPATIENT
Start: 2022-09-18 | End: 2022-09-18

## 2022-09-18 RX ORDER — LABETALOL HYDROCHLORIDE 5 MG/ML
10 INJECTION, SOLUTION INTRAVENOUS
Status: DISCONTINUED | OUTPATIENT
Start: 2022-09-18 | End: 2022-09-18

## 2022-09-18 RX ORDER — SODIUM CHLORIDE, SODIUM LACTATE, POTASSIUM CHLORIDE, CALCIUM CHLORIDE 600; 310; 30; 20 MG/100ML; MG/100ML; MG/100ML; MG/100ML
INJECTION, SOLUTION INTRAVENOUS CONTINUOUS PRN
Status: DISCONTINUED | OUTPATIENT
Start: 2022-09-18 | End: 2022-09-18

## 2022-09-18 RX ORDER — HEPARIN SODIUM 5000 [USP'U]/.5ML
5000 INJECTION, SOLUTION INTRAVENOUS; SUBCUTANEOUS EVERY 8 HOURS
Status: DISCONTINUED | OUTPATIENT
Start: 2022-09-19 | End: 2022-09-20 | Stop reason: HOSPADM

## 2022-09-18 RX ORDER — LABETALOL HYDROCHLORIDE 5 MG/ML
10 INJECTION, SOLUTION INTRAVENOUS
Status: DISCONTINUED | OUTPATIENT
Start: 2022-09-18 | End: 2022-09-20 | Stop reason: HOSPADM

## 2022-09-18 RX ADMIN — ACETAMINOPHEN 650 MG: 325 TABLET ORAL at 18:07

## 2022-09-18 RX ADMIN — FENTANYL CITRATE 50 MCG: 50 INJECTION, SOLUTION INTRAMUSCULAR; INTRAVENOUS at 09:54

## 2022-09-18 RX ADMIN — FENTANYL CITRATE 50 MCG: 50 INJECTION, SOLUTION INTRAMUSCULAR; INTRAVENOUS at 09:40

## 2022-09-18 RX ADMIN — PHENYLEPHRINE HYDROCHLORIDE 100 MCG: 10 INJECTION INTRAVENOUS at 10:06

## 2022-09-18 RX ADMIN — Medication 5 MG: at 10:09

## 2022-09-18 RX ADMIN — PHENYLEPHRINE HYDROCHLORIDE 100 MCG: 10 INJECTION INTRAVENOUS at 10:12

## 2022-09-18 RX ADMIN — Medication 1 UNITS: at 10:13

## 2022-09-18 RX ADMIN — Medication 5 MG: at 10:32

## 2022-09-18 RX ADMIN — PHENYLEPHRINE HYDROCHLORIDE 100 MCG: 10 INJECTION INTRAVENOUS at 10:08

## 2022-09-18 RX ADMIN — POTASSIUM CHLORIDE, DEXTROSE MONOHYDRATE AND SODIUM CHLORIDE: 150; 5; 450 INJECTION, SOLUTION INTRAVENOUS at 18:48

## 2022-09-18 RX ADMIN — LABETALOL HYDROCHLORIDE 10 MG: 5 INJECTION, SOLUTION INTRAVENOUS at 15:56

## 2022-09-18 RX ADMIN — SENNOSIDES AND DOCUSATE SODIUM 1 TABLET: 50; 8.6 TABLET ORAL at 20:49

## 2022-09-18 RX ADMIN — LABETALOL 20 MG/4 ML (5 MG/ML) INTRAVENOUS SYRINGE 10 MG: at 11:28

## 2022-09-18 RX ADMIN — SUGAMMADEX 200 MG: 100 INJECTION, SOLUTION INTRAVENOUS at 11:21

## 2022-09-18 RX ADMIN — PROPOFOL 180 MG: 10 INJECTION, EMULSION INTRAVENOUS at 09:47

## 2022-09-18 RX ADMIN — LABETALOL 20 MG/4 ML (5 MG/ML) INTRAVENOUS SYRINGE 10 MG: at 11:40

## 2022-09-18 RX ADMIN — POTASSIUM CHLORIDE, DEXTROSE MONOHYDRATE AND SODIUM CHLORIDE: 150; 5; 450 INJECTION, SOLUTION INTRAVENOUS at 21:04

## 2022-09-18 RX ADMIN — ONDANSETRON 4 MG: 2 INJECTION INTRAMUSCULAR; INTRAVENOUS at 09:56

## 2022-09-18 RX ADMIN — PHENYLEPHRINE HYDROCHLORIDE 100 MCG: 10 INJECTION INTRAVENOUS at 10:09

## 2022-09-18 RX ADMIN — Medication 5 MG: at 10:12

## 2022-09-18 RX ADMIN — LIDOCAINE HYDROCHLORIDE 100 MG: 20 INJECTION, SOLUTION INFILTRATION; PERINEURAL at 09:46

## 2022-09-18 RX ADMIN — LABETALOL 20 MG/4 ML (5 MG/ML) INTRAVENOUS SYRINGE 10 MG: at 11:25

## 2022-09-18 RX ADMIN — ROCURONIUM BROMIDE 50 MG: 50 INJECTION, SOLUTION INTRAVENOUS at 09:48

## 2022-09-18 RX ADMIN — LABETALOL HYDROCHLORIDE 20 MG: 5 INJECTION, SOLUTION INTRAVENOUS at 07:55

## 2022-09-18 RX ADMIN — Medication 5 MG: at 10:14

## 2022-09-18 RX ADMIN — KETOROLAC TROMETHAMINE 15 MG: 15 INJECTION, SOLUTION INTRAMUSCULAR; INTRAVENOUS at 14:21

## 2022-09-18 RX ADMIN — Medication 2 G: at 09:51

## 2022-09-18 RX ADMIN — KETOROLAC TROMETHAMINE 15 MG: 15 INJECTION, SOLUTION INTRAMUSCULAR; INTRAVENOUS at 20:49

## 2022-09-18 RX ADMIN — LABETALOL HYDROCHLORIDE 10 MG: 5 INJECTION, SOLUTION INTRAVENOUS at 20:53

## 2022-09-18 RX ADMIN — ROCURONIUM BROMIDE 10 MG: 50 INJECTION, SOLUTION INTRAVENOUS at 10:55

## 2022-09-18 RX ADMIN — FAMOTIDINE 20 MG: 10 INJECTION INTRAVENOUS at 08:07

## 2022-09-18 RX ADMIN — PHENYLEPHRINE HYDROCHLORIDE 0.4 MCG/KG/MIN: 10 INJECTION INTRAVENOUS at 10:14

## 2022-09-18 RX ADMIN — HYDRALAZINE HYDROCHLORIDE 10 MG: 20 INJECTION INTRAMUSCULAR; INTRAVENOUS at 05:25

## 2022-09-18 RX ADMIN — SODIUM CHLORIDE, POTASSIUM CHLORIDE, SODIUM LACTATE AND CALCIUM CHLORIDE: 600; 310; 30; 20 INJECTION, SOLUTION INTRAVENOUS at 11:28

## 2022-09-18 RX ADMIN — SODIUM CHLORIDE, POTASSIUM CHLORIDE, SODIUM LACTATE AND CALCIUM CHLORIDE: 600; 310; 30; 20 INJECTION, SOLUTION INTRAVENOUS at 09:40

## 2022-09-18 RX ADMIN — PROPOFOL 150 MCG/KG/MIN: 10 INJECTION, EMULSION INTRAVENOUS at 09:48

## 2022-09-18 RX ADMIN — DEXAMETHASONE SODIUM PHOSPHATE 4 MG: 4 INJECTION, SOLUTION INTRA-ARTICULAR; INTRALESIONAL; INTRAMUSCULAR; INTRAVENOUS; SOFT TISSUE at 09:56

## 2022-09-18 RX ADMIN — FAMOTIDINE 20 MG: 10 INJECTION INTRAVENOUS at 21:29

## 2022-09-18 RX ADMIN — SODIUM CHLORIDE, POTASSIUM CHLORIDE, SODIUM LACTATE AND CALCIUM CHLORIDE: 600; 310; 30; 20 INJECTION, SOLUTION INTRAVENOUS at 13:05

## 2022-09-18 RX ADMIN — MIDAZOLAM 2 MG: 1 INJECTION INTRAMUSCULAR; INTRAVENOUS at 09:40

## 2022-09-18 ASSESSMENT — ACTIVITIES OF DAILY LIVING (ADL)
ADLS_ACUITY_SCORE: 22

## 2022-09-18 NOTE — PROVIDER NOTIFICATION
MD Notification    Notified Person: MD    Notified Person Name:  Rylee Wharton MD         Notification Date/Time:9-18 0740    Notification Interaction:web page    Purpose of Notification:Diagn Lap procedure scheduled this am. Prn Hydralazine given 0525. BP recheck 158/95 at 0637. BP now 172/98.    Orders Received:  Give sched labetalol     Comments:

## 2022-09-18 NOTE — PROVIDER NOTIFICATION
MD Notification    Notified Person: MD    Notified Person Name: Yaa Najera    Notification Date/Time: 9-18 1804    Notification Interaction: amcom    Purpose of Notification:LR running at 25 mL. Two fluid orders. Which one should be running? Fyi not eating or drinking. Lots of encouragement provided.    Orders Received:    Comments:

## 2022-09-18 NOTE — PROGRESS NOTES
"GENERAL SURGERY PROGRESS NOTE    SUBJECTIVE:  Patient reports ongoing nausea and small volume emesis over the day yesterday. RUTH drain still with minimal output.    OBJECTIVE:  Vital signs:  Temp: 98.3  F (36.8  C) Temp src: Oral BP: (!) 158/103 Pulse: 87   Resp: 18 SpO2: 100 % O2 Device: None (Room air) Oxygen Delivery: 2 LPM      Estimated body mass index is 31.8 kg/m  as calculated from the following:    Height as of 6/29/22: 1.803 m (5' 11\").    Weight as of 9/6/22: 103.4 kg (228 lb).      GENERAL: NAD  RESPIRATORY: No dyspnea  CARDIOVASCULAR: RRR  ABDOMEN: soft, nondistended, mild ttp in epigastric and LUQ regions without rebound or guarding. RUTH drain with scant dark serosanguineous output  EXTREMITIES: No edema or tenderness    PERTINENT LABS or IMAGING:  No new imaging/lab work to review this AM    ASSESSMENT:  46 year old female with perigastric fluid collection (secondary to postoperative hematoma), s/p percutaneous drain placement on 9/15/22 by Interventional Radiology, with ongoing nausea, vomiting, GERD and PO intolerance. Dehydration resolved. Persistent hypertension during inpatient admission.    PLAN:  - Plan for OR today for diagnostic laparoscopy and drainage of intra-abdominal/perigastric hematoma  - NPO for procedure today; likely restart clear liquids postop  - Continue IV fluids for hydration in the interim  - VTE prophylaxis: SQH and SCDs while in bed  - Discharge pending improvement in PO intake    CRISTINA OLIVA MD on 9/18/2022 at 8:16 AM  Minimally Invasive & Bariatric Surgery   [facility]      "

## 2022-09-18 NOTE — OP NOTE
OPERATIVE REPORT    Surgeon(s)/Proceduralist(s) and Assistants (if any):  Surgeon(s):  Michael Villarreal MD Panait, Lucian, MD  Circulator: Aline Sharpe RN; Leila Miller RN  Scrub Person: Rivka August    Pre-operative/Pre-procedural Diagnosis:  Perigastric intra-abdominal hematoma  Intractable nausea and vomiting  P.o. intolerance    Procedure(s):  Procedure(s):  Diagnostic laparoscopy, drainage of perigastric intra-abdominal hematoma    Procedure(s) findings:   Large, retrogastric hematoma with old blood and debris; laparoscopic drainage performed with unroofing of hematoma pseudocapsule.  X2 19 [place] round Jose Rafael drains placed in the space of the hematoma following drainage to facilitate ongoing postoperative drainage.    Specimen(s) removed:   None    Drain(s):  19 Fr round Jose Rafael drains (x2)    (EBL) Estimated blood loss (ml):   25 cc    Postoperative/Postprocedure Diagnosis:   Perigastric intra-abdominal hematoma  Intractable nausea and vomiting  P.o. intolerance    Description of the Procedure:    The risks, benefits, and alternatives of surgical intervention were reviewed with the patient prior to proceeding to the operating room.  Consent was signed and placed in the patient's chart.  The patient was brought to the operating room and placed in the supine position with arms out on arm boards and all pressure points padded and protected.  General endotracheal anesthesia was induced without complication.  SCDs were in place and functional at the time of anesthesia induction.  A preoperative antibiotic was administered. The patient's abdomen was prepped and draped in a normal sterile fashion.  A timeout was performed prior to beginning the procedure.    Initial port access was obtained in the right upper quadrant subcostal region; a 5 mm skin incision was made at approximately the midclavicular line after local anesthetic was infiltrated into the skin.  A 5 mm optical trocar was introduced directly  into the peritoneal cavity under laparoscopic vision.  Pneumoperitoneum was established to a goal insufflation pressure of 15 mmHg.  The laparoscope was introduced and used to inspect the abdominal cavity; there was no evidence of injury to the underlying viscera following initial trocar placement.    We first began by performing bilateral transversus abdominis plane (TAP) blocks.  10 cc of 0.25% bupivacaine with epinephrine local anesthetic was infiltrated into 3 separate locations along the transversus abdominis plane under direct laparoscopic vision on both the left and right sides of the abdomen.    Next, we proceeded with placement of additional working ports.  A 5 mm port was placed approximately 12 cm inferior to the xiphoid and immediately to the left of midline.  An additional 5 mm working port was placed in the left upper quadrant.  A final 5 mm working port was placed just superior and to the right of the umbilicus.  The patient was then placed in reverse Trendelenburg position.  A 5 mm skin incision was created at the epigastric region and a Kong liver retractor was placed to retract the left lobe of the liver and expose the anterior surface of the gastric remnant.    We then began our dissection. A LigaSure device was used to enter the lesser sac.  Of note, the transverse mesocolon was densely adherent to the thick rind/pseudocapsule of the hematoma.  The hematoma was encountered immediately posterior to the gastric remnant staple line; we were able to visualize the percutaneous pigtail drain entering the fluid collection and use this as a marker to expose the hematoma cavity.  The anterior pseudocapsule was unroofed with the LigaSure device and we noted a significant amount of old, clotted blood and debris within the fluid collection.  The fluid and debris was evacuated with a laparoscopic suction , after which the fluid collection was copiously irrigated with sterile saline and suctioned  free.  At this point, the previously placed percutaneous drain was removed. We inspected the region and noted a moderate amount of inflammation around the anterior pseudocapsule of the hematoma.  During unroofing of the hematoma, a small defect was created in the transverse mesocolon given how adherent the transverse mesocolon was to the posterior aspect of the pseudocapsule; we elected to close this defect so as to prevent any future internal hernia at this location.  We closed the defect laparoscopically with a figure-of-eight 3-0 silk suture placed intracorporeally.    Given the size of the perigastric fluid collection, we elected to widely drain the space. Two 19 Yi round Jose Rafael drains were introduced into the peritoneal cavity through the left upper quadrant and right upper quadrant 5 mm trocar sites; the tips of the drains were placed along the most proximal aspect of the drained hematoma, and both drains were placed posterior to the gastric remnant.  The drains were brought out of the skin at the right upper quadrant and left upper quadrant port sites, and were secured to the skin with 2-0 Nylon and 2-0 silk sutures.    Pneumoperitoneum was evacuated from the peritoneal cavity after the Kong liver retractor was removed.  All skin incisions with the exception of the drain sites were closed with 4-0 Monocryl suture in subcuticular fashion.  Exofin skin glue was placed over all skin incisions.  Drain sponges were placed over the drain sites.    During the procedure, the patient did have some labile blood pressure with several episodes of hypertension with diastolic blood pressure exceeding 110 mmHg; this was managed by our anesthesia colleagues with placement of a left radial arterial line for intraoperative evaluation of mean arterial pressure and antihypertensive regimen.  The patient otherwise tolerated the procedure well without any apparent complications.  She was allowed to awaken from anesthesia,  was extubated, and was transferred to the recovery room in good condition.  I was present and scrubbed for the entire duration of the procedure.    As no qualified resident was available for first assistance, Dr. Mariano Graves served as the first assist for this procedure; he assisted with port placement, evacuation of hematoma, drain placement and closure.    Michael Villarreal MD 9/18/2022 11:44 AM  Minimally Invasive & Bariatric Surgery   [facility]

## 2022-09-18 NOTE — PLAN OF CARE
Orientation/Cognitive: A&O x 4. Quiet  Observation Goals (Met/ Not Met): N/A. Inpatient status  Mobility Level/Assist Equipment: SBA with IV pole  Fall Risk (Y/N): Yes  Behavior Concerns: No  Pain Management: PRN Acetaminophen given for headache   Tele/VS/O2: VSS on RA ex htn  ABNL Lab/BG:   Diet: Clears. NPO @ midnight  Bowel/Bladder: Continent  Skin Concerns: Scheduled RUTH irrigation. Dark red output.   Drains/Devices: RUTH drain; PIV infusing  Tests/Procedures for next shift: Plan for diagnostic laparoscopy tomorrow am  Anticipated DC date & active delays: TBD  Patient Stated Goal for Today:

## 2022-09-18 NOTE — PROGRESS NOTES
"CROSS COVER    Paged by RN for \"LR running at 25 mL. Two fluid orders. Which one should be running? FYI, not eating or drinking. Lots of encouragement.\"      PLAN:  -LR at 25 mL/hour was discontinued; the order states that this was pre-op fluid only  -Resume dextrose, 0.45% NaCl- with KCI per order    Yaa Najera NP  [facility]  Securely message with the [employer] Console (learn more here)  Text page via [facility] Paging/Directory    "

## 2022-09-18 NOTE — ANESTHESIA POSTPROCEDURE EVALUATION
Patient: Tari Nicolas    Procedure: Procedure(s):  Diagnostic LAPAROSCOPY  DRAINAGE INTRA ABDOMINAL HEMATOMA       Anesthesia Type:  General    Note:  Disposition: Inpatient   Postop Pain Control: Uneventful            Sign Out: Well controlled pain   PONV: No   Neuro/Psych: Uneventful            Sign Out: Acceptable/Baseline neuro status   Airway/Respiratory: Uneventful            Sign Out: Acceptable/Baseline resp. status   CV/Hemodynamics: Uneventful            Sign Out: Acceptable CV status   Other NRE: NONE   DID A NON-ROUTINE EVENT OCCUR? No           Last vitals:  Vitals Value Taken Time   /99 09/18/22 1210   Temp 36.3  C (97.3  F) 09/18/22 1200   Pulse 87 09/18/22 1211   Resp 22 09/18/22 1211   SpO2 100 % 09/18/22 1211   Vitals shown include unvalidated device data.    Electronically Signed By: Venkata Vo MD  September 18, 2022  12:13 PM

## 2022-09-18 NOTE — PLAN OF CARE
Orientation/Cognitive: A/Ox4   Observation Goals (Met/ Not Met): Inpatient   Mobility Level/Assist Equipment: SBA   Fall Risk (Y/N): Yes   Behavior Concerns: None  Pain Management: Denies   Tele/VS/O2: HTN- IV hydralazine given prn. MD placed orders for IV labetalol after administration. Will handoff. On RA; no tele   ABNL Lab/BG: See chart   Diet: NPO   Bowel/Bladder: Continent. Voiding adequately. No BM. Pt requests bowel regimen meds   Skin Concerns: RUTH drain CDI  Drains/Devices: RUTH irrigated. Output- 5 ml, dark red. PIV infusing   Tests/Procedures for next shift: Diagnostic laparoscopy   Anticipated DC date & active delays: TBD   Patient Stated Goal for Today: Sleep     Emesis x2

## 2022-09-18 NOTE — ANESTHESIA PROCEDURE NOTES
Arterial Line Procedure Note    Pre-Procedure   Staff -        Anesthesiologist:  Venkata Vo MD       Performed By: anesthesiologist       Location: pre-op       Pre-Anesthestic Checklist: patient identified, IV checked, risks and benefits discussed, informed consent, monitors and equipment checked and pre-op evaluation  Timeout:       Correct Patient: Yes        Correct Procedure: Yes        Correct Site: Yes        Correct Position: Yes   Line Placement:   This line was placed Post Induction  Procedure   Procedure: arterial line       Laterality: left       Insertion Site: radial.  Sterile Prep        Standard elements of sterile barrier followed       Skin prep: Chloraprep  Insertion/Injection        Technique: ultrasound guided        1. Ultrasound was used to evaluate the access site.       2. Artery evaluated via ultrasound for patency/adequacy.       3. Using real-time ultrasound the needle/catheter was observed entering the artery/vein.       5. The visualized structures were anatomically normal.       6. There were no apparent abnormal pathologic findings.       Catheter Type/Size: 20 G, 1.75 in/4.5 cm quick cath (integral wire)  Narrative         Secured by: other       Tegaderm dressing used.       Complications: None apparent,        Arterial waveform: Yes    Comments:  Arterial line secured with a Tegaderm and tape. Sterile gloves, mask, hat. No IMAGES SAVED. Placed for hemodynamic instability with BP cuff.

## 2022-09-18 NOTE — PLAN OF CARE
Orientation/Cognitive: A&O Flat affect baseline. POD0 Diagnostic LAPAROSCOPY, DRAINAGE INTRA ABDOMINAL HEMATOMA  Observation Goals (Met/ Not Met): Inp  Mobility Level/Assist Equipment: A1-2 IV pole  Fall Risk (Y/N): Y. Dizzy at times, blurry vision  Behavior Concerns: N  Pain Management: Tylenol given HA. Scheduled toradol given   Tele/VS/O2: HTN Prn labetalol given. Hydralazine available. Other VSS stable, IPI 8-10 on RA. Incentive encouraged, moderate tolerance.   ABNL Lab/BG: CT head done rt blurry vision, cultures pending.   Diet: clear liquid diet. Not tolerating. Encouragement provided, little effort made by pt to eat.   Bowel/Bladder: voiding urine. Nausea interm, refusing antiemetic   Skin Concerns: lap sites, drainage sites CDI  Drains/Devices: 2 RUTH drains to bulb suction, lots of bright red drainage. Both RUTH stripped at 1800.   Tests/Procedures for next shift: no  Anticipated DC date & active delays: TBD  Patient Stated Goal for Today: feel better. Missing glasses after bringing to CT approx 1530, compass done.

## 2022-09-18 NOTE — PROGRESS NOTES
[facility]    Medicine Progress Note - Hospitalist Service       Date of Admission:  9/14/2022    Assessment & Plan   Tari Nicolas is a 46 year old female with hx of recent gastric sleeve procedure who was admitted from GI clinic to the General Surgery service on 9/14/2022 for intractable nausea/vomiting. Hospitalist service was consulted for elevated blood pressures    Intractable nausea/vomiting due to gastric outlet obstruction  Perigastric intra-abdominal fluid collection, hematoma suspected  * 06/2022: Underwent gastric sleeve procedure at [facility] 06/2022. Procedure was complicated by acute blood loss anemia which was treated with iron. Was doing reasonably well at her 6 week follow-up appointment  * 9/6/22: Presented to the ED with ongoing abdominal pain, nausea/vomiting/diarrhea. CT abd/pelvis at that time showed large fluid collection suggestive of hematoma. Case was discussed with GI; given that patient's symptoms improved with supportive cares, it was recommended that she discharge home with short-term follow up  * 9/14/22: Presented to GI clinic with worsening nausea/vomiting, and was referred to the ED due to concern for gastric outlet obstruction from autumn-gastric fluid collection. General Surgery was notified in the ED, and IR was consulted for percutaneous drainage of hematoma.   * 9/15: Underwent drain placement by IR without significant improvement in symptoms  - Plan diagnostic laparoscopy with drainage of hematoma today, 9/18  - Post-op cares, pain control, and VTE prophylaxis as per Gen Surgery  - On IV PPI    Elevated blood pressures  Blurry vision  * Patient developed blurry vision this admission in setting of elevated BPs to 170s systolic noted this admission  * Patient has no prior history of hypertension. Suspect elevated blood pressures are related to acute illness with ongoing nausea/vomiting  - Would favor utilizing prns for now given poor PO tolerance and  no prior history of hypertension  - Labetalol 10 mg IV q2h prn ordered for SBP>170. Second-line agent, IV hydralazine also available  - Head CT ordered to rule out intra-cranial pathology for blurry vision     Diet: NPO per Anesthesia Guidelines for Procedure/Surgery Except for: Meds    DVT Prophylaxis: Defer to primary service  Zavaleta Catheter: Not present  Code Status: Full Code         Disposition: Expected discharge as per General Surgery, once tolerating PO    The patient's care was discussed with the Bedside Nurse, Patient and Patient's Family.    Rylee Wharton MD  Hospitalist Service  [facility]  Contact information available via [facility] Paging/Directory    ______________________________________________________________________    Interval History   Ongoing nausea/vomiting, poor appetite in past 24 hours. Also reports persistent blurry vision. Initiated on amlodipine yesterday, but I d/c'd this today given poor PO tolerance and low suspicion for underlying hypertension. Patient also reports that she has no history of hypertension.  updated at bedside  - IV antihypertensives ordered prn; instructions reviewed with bedside RN    Time Spent on this Encounter   I spent 35 minutes on the unit/floor managing the care of Tari Nicolas. Over 50% of my time was spent on the following:   - Counseling the patient and/or family regarding: diagnostic results and risks and benefits of treatment options  - Coordination of care with the: nurse    Rylee Wharton MD    Data reviewed today: I reviewed all medications, new labs and imaging results over the last 24 hours. I personally reviewed no images or EKG's today.    Physical Exam   Vital Signs: Temp: 98.3  F (36.8  C) Temp src: Oral BP: (!) 149/94 Pulse: 85   Resp: 18 SpO2: 100 % O2 Device: None (Room air)    Weight: 0 lbs 0 oz  Constitutional: Resting in bed, NAD  HEENT: Sclera white, MMM  Respiratory: Breathing non-labored.  Lungs CTAB - no wheezes, crackles, or rhonchi  Cardiovascular: Heart RRR, no m/r/g. No pedal edema  GI: +BS, abd soft/NT  Skin/Integument: No rash  Musculoskeletal: Normal muscle bulk and tone  Neuro: Alert and appropriate, VALLE  Psych: Calm and cooperative    Data   Recent Labs   Lab 09/16/22  0705 09/15/22  2110 09/15/22  1100 09/15/22  0625   WBC  --   --   --  11.6*   HGB  --   --   --  11.7   MCV  --   --   --  83   PLT  --   --   --  240   INR  --   --  1.19*  --    NA  --   --   --  135   POTASSIUM  --   --   --  3.4   CHLORIDE  --   --   --  101   CO2  --   --   --  24   BUN  --   --   --  7   CR  --   --   --  0.56   ANIONGAP  --   --   --  10   MAHAD  --   --   --  9.3   GLC 98 104*  --  90   ALBUMIN  --   --   --  3.2*   PROTTOTAL  --   --   --  6.9   BILITOTAL  --   --   --  1.3   ALKPHOS  --   --   --  84   ALT  --   --   --  22   AST  --   --   --  14         No results found for this or any previous visit (from the past 24 hour(s)).    Medications     dextrose 5% and 0.45% NaCl + KCl 20 mEq/L Stopped (09/18/22 0753)       [Auto Hold] famotidine  20 mg Intravenous Q12H     [Auto Hold] heparin ANTICOAGULANT  5,000 Units Subcutaneous Q12H     [Auto Hold] sodium chloride (PF)  10 mL Irrigation Q8H     [Auto Hold] sodium chloride (PF)  3 mL Intracatheter Q8H

## 2022-09-18 NOTE — PROVIDER NOTIFICATION
MD Notification    Notified Person: MD    Notified Person Name: Rylee Wharton    Notification Date/Time: 9-18 1752    Notification Interaction: vocera msg    Purpose of Notification:LR running at 25 mL. Two fluid orders. Which one should be running. Fyi not eating or drinking. Lots of encouragement provided. Fyi did not irrigate RUTH as there is no longer a port avail to irrigate. 10 mL NS is still in Mar.    Orders Received:    Comments:

## 2022-09-18 NOTE — ANESTHESIA CARE TRANSFER NOTE
Patient: Tari Nicolas    Procedure: Procedure(s):  Diagnostic LAPAROSCOPY  DRAINAGE INTRA ABDOMINAL HEMATOMA       Diagnosis: Abdominal hematoma [S30.1XXA]  Diagnosis Additional Information: No value filed.    Anesthesia Type:   General     Note:    Oropharynx: oropharynx clear of all foreign objects  Level of Consciousness: awake  Oxygen Supplementation: room air    Independent Airway: airway patency satisfactory and stable  Dentition: dentition unchanged  Vital Signs Stable: post-procedure vital signs reviewed and stable  Report to RN Given: handoff report given  Patient transferred to: PACU    Handoff Report: Identifed the Patient, Identified the Reponsible Provider, Reviewed the pertinent medical history, Discussed the surgical course, Reviewed Intra-OP anesthesia mangement and issues during anesthesia, Set expectations for post-procedure period and Allowed opportunity for questions and acknowledgement of understanding      Vitals:  Vitals Value Taken Time   /88 09/18/22 1135   Temp     Pulse 87 09/18/22 1138   Resp 15 09/18/22 1138   SpO2 97 % 09/18/22 1137   Vitals shown include unvalidated device data.    Electronically Signed By: JOY Alexander CRNA  September 18, 2022  11:40 AM

## 2022-09-19 LAB
ANION GAP SERPL CALCULATED.3IONS-SCNC: 13 MMOL/L (ref 3–14)
BASOPHILS # BLD AUTO: 0 10E3/UL (ref 0–0.2)
BASOPHILS NFR BLD AUTO: 0 %
BUN SERPL-MCNC: 5 MG/DL (ref 7–30)
CALCIUM SERPL-MCNC: 9.2 MG/DL (ref 8.5–10.1)
CHLORIDE BLD-SCNC: 103 MMOL/L (ref 94–109)
CO2 SERPL-SCNC: 21 MMOL/L (ref 20–32)
CREAT SERPL-MCNC: 0.52 MG/DL (ref 0.52–1.04)
EOSINOPHIL # BLD AUTO: 0 10E3/UL (ref 0–0.7)
EOSINOPHIL NFR BLD AUTO: 0 %
ERYTHROCYTE [DISTWIDTH] IN BLOOD BY AUTOMATED COUNT: 15.7 % (ref 10–15)
GFR SERPL CREATININE-BSD FRML MDRD: >90 ML/MIN/1.73M2
GLUCOSE BLD-MCNC: 155 MG/DL (ref 70–99)
HCT VFR BLD AUTO: 35.4 % (ref 35–47)
HGB BLD-MCNC: 11.7 G/DL (ref 11.7–15.7)
IMM GRANULOCYTES # BLD: 0.1 10E3/UL
IMM GRANULOCYTES NFR BLD: 1 %
LYMPHOCYTES # BLD AUTO: 2.1 10E3/UL (ref 0.8–5.3)
LYMPHOCYTES NFR BLD AUTO: 16 %
MAGNESIUM SERPL-MCNC: 1.8 MG/DL (ref 1.6–2.3)
MAGNESIUM SERPL-MCNC: 2 MG/DL (ref 1.6–2.3)
MCH RBC QN AUTO: 25.9 PG (ref 26.5–33)
MCHC RBC AUTO-ENTMCNC: 33.1 G/DL (ref 31.5–36.5)
MCV RBC AUTO: 78 FL (ref 78–100)
MONOCYTES # BLD AUTO: 0.6 10E3/UL (ref 0–1.3)
MONOCYTES NFR BLD AUTO: 5 %
NEUTROPHILS # BLD AUTO: 10.3 10E3/UL (ref 1.6–8.3)
NEUTROPHILS NFR BLD AUTO: 78 %
NRBC # BLD AUTO: 0 10E3/UL
NRBC BLD AUTO-RTO: 0 /100
PHOSPHATE SERPL-MCNC: 2.5 MG/DL (ref 2.5–4.5)
PHOSPHATE SERPL-MCNC: 2.6 MG/DL (ref 2.5–4.5)
PLATELET # BLD AUTO: 227 10E3/UL (ref 150–450)
POTASSIUM BLD-SCNC: 2.8 MMOL/L (ref 3.4–5.3)
POTASSIUM BLD-SCNC: 3.8 MMOL/L (ref 3.4–5.3)
RBC # BLD AUTO: 4.52 10E6/UL (ref 3.8–5.2)
SODIUM SERPL-SCNC: 137 MMOL/L (ref 133–144)
WBC # BLD AUTO: 13.2 10E3/UL (ref 4–11)

## 2022-09-19 PROCEDURE — 250N000013 HC RX MED GY IP 250 OP 250 PS 637: Performed by: INTERNAL MEDICINE

## 2022-09-19 PROCEDURE — 99232 SBSQ HOSP IP/OBS MODERATE 35: CPT | Performed by: INTERNAL MEDICINE

## 2022-09-19 PROCEDURE — 84100 ASSAY OF PHOSPHORUS: CPT | Performed by: SURGERY

## 2022-09-19 PROCEDURE — 250N000011 HC RX IP 250 OP 636: Performed by: SURGERY

## 2022-09-19 PROCEDURE — 83735 ASSAY OF MAGNESIUM: CPT | Performed by: SURGERY

## 2022-09-19 PROCEDURE — 36415 COLL VENOUS BLD VENIPUNCTURE: CPT | Performed by: INTERNAL MEDICINE

## 2022-09-19 PROCEDURE — 36415 COLL VENOUS BLD VENIPUNCTURE: CPT | Performed by: SURGERY

## 2022-09-19 PROCEDURE — 82310 ASSAY OF CALCIUM: CPT | Performed by: SURGERY

## 2022-09-19 PROCEDURE — 84132 ASSAY OF SERUM POTASSIUM: CPT | Performed by: INTERNAL MEDICINE

## 2022-09-19 PROCEDURE — 84100 ASSAY OF PHOSPHORUS: CPT | Performed by: INTERNAL MEDICINE

## 2022-09-19 PROCEDURE — 250N000013 HC RX MED GY IP 250 OP 250 PS 637: Performed by: SURGERY

## 2022-09-19 PROCEDURE — 120N000001 HC R&B MED SURG/OB

## 2022-09-19 PROCEDURE — 85025 COMPLETE CBC W/AUTO DIFF WBC: CPT | Performed by: SURGERY

## 2022-09-19 PROCEDURE — 83735 ASSAY OF MAGNESIUM: CPT | Performed by: INTERNAL MEDICINE

## 2022-09-19 PROCEDURE — 258N000003 HC RX IP 258 OP 636: Performed by: HOSPITALIST

## 2022-09-19 RX ORDER — CHOLECALCIFEROL (VITAMIN D3) 50 MCG
50 TABLET ORAL DAILY
Status: DISCONTINUED | OUTPATIENT
Start: 2022-09-19 | End: 2022-09-20 | Stop reason: HOSPADM

## 2022-09-19 RX ORDER — FAMOTIDINE 20 MG/1
20 TABLET, FILM COATED ORAL 2 TIMES DAILY
Qty: 60 TABLET | Refills: 2 | Status: SHIPPED | OUTPATIENT
Start: 2022-09-19 | End: 2022-09-20

## 2022-09-19 RX ORDER — CARVEDILOL 12.5 MG/1
12.5 TABLET ORAL 2 TIMES DAILY WITH MEALS
Status: DISCONTINUED | OUTPATIENT
Start: 2022-09-19 | End: 2022-09-20 | Stop reason: HOSPADM

## 2022-09-19 RX ORDER — MULTIPLE VITAMINS W/ MINERALS TAB 9MG-400MCG
1 TAB ORAL DAILY
Status: DISCONTINUED | OUTPATIENT
Start: 2022-09-19 | End: 2022-09-20 | Stop reason: HOSPADM

## 2022-09-19 RX ORDER — SUCRALFATE ORAL 1 G/10ML
1 SUSPENSION ORAL
Status: DISCONTINUED | OUTPATIENT
Start: 2022-09-19 | End: 2022-09-20 | Stop reason: HOSPADM

## 2022-09-19 RX ORDER — POTASSIUM CHLORIDE 1500 MG/1
40 TABLET, EXTENDED RELEASE ORAL ONCE
Status: COMPLETED | OUTPATIENT
Start: 2022-09-19 | End: 2022-09-19

## 2022-09-19 RX ORDER — POLYETHYLENE GLYCOL 3350 17 G/17G
17 POWDER, FOR SOLUTION ORAL DAILY PRN
Qty: 510 G | Refills: 0 | Status: SHIPPED | OUTPATIENT
Start: 2022-09-19

## 2022-09-19 RX ORDER — FAMOTIDINE 40 MG/5ML
20 POWDER, FOR SUSPENSION ORAL 2 TIMES DAILY
Status: DISCONTINUED | OUTPATIENT
Start: 2022-09-19 | End: 2022-09-20 | Stop reason: HOSPADM

## 2022-09-19 RX ORDER — POTASSIUM CHLORIDE 1.5 G/1.58G
40 POWDER, FOR SOLUTION ORAL 2 TIMES DAILY
Status: DISCONTINUED | OUTPATIENT
Start: 2022-09-19 | End: 2022-09-20 | Stop reason: HOSPADM

## 2022-09-19 RX ORDER — SUCRALFATE ORAL 1 G/10ML
1 SUSPENSION ORAL 4 TIMES DAILY
Qty: 1000 ML | Refills: 0 | Status: SHIPPED | OUTPATIENT
Start: 2022-09-19 | End: 2022-09-20

## 2022-09-19 RX ORDER — TRAMADOL HYDROCHLORIDE 50 MG/1
50 TABLET ORAL EVERY 6 HOURS PRN
Qty: 10 TABLET | Refills: 0 | Status: SHIPPED | OUTPATIENT
Start: 2022-09-19 | End: 2022-09-20

## 2022-09-19 RX ADMIN — POTASSIUM CHLORIDE 40 MEQ: 1.5 POWDER, FOR SOLUTION ORAL at 08:18

## 2022-09-19 RX ADMIN — HYDRALAZINE HYDROCHLORIDE 10 MG: 20 INJECTION INTRAMUSCULAR; INTRAVENOUS at 16:09

## 2022-09-19 RX ADMIN — KETOROLAC TROMETHAMINE 15 MG: 15 INJECTION, SOLUTION INTRAMUSCULAR; INTRAVENOUS at 02:58

## 2022-09-19 RX ADMIN — DEXTROSE AND SODIUM CHLORIDE: 5; 900 INJECTION, SOLUTION INTRAVENOUS at 22:30

## 2022-09-19 RX ADMIN — CARVEDILOL 12.5 MG: 12.5 TABLET, FILM COATED ORAL at 11:36

## 2022-09-19 RX ADMIN — HEPARIN SODIUM 5000 UNITS: 5000 INJECTION, SOLUTION INTRAVENOUS; SUBCUTANEOUS at 06:25

## 2022-09-19 RX ADMIN — CARVEDILOL 12.5 MG: 12.5 TABLET, FILM COATED ORAL at 19:26

## 2022-09-19 RX ADMIN — KETOROLAC TROMETHAMINE 15 MG: 15 INJECTION, SOLUTION INTRAMUSCULAR; INTRAVENOUS at 08:17

## 2022-09-19 RX ADMIN — POTASSIUM CHLORIDE 40 MEQ: 1500 TABLET, EXTENDED RELEASE ORAL at 16:09

## 2022-09-19 RX ADMIN — SUCRALFATE ORAL 1 G: 1 SUSPENSION ORAL at 10:32

## 2022-09-19 RX ADMIN — SUCRALFATE ORAL 1 G: 1 SUSPENSION ORAL at 22:16

## 2022-09-19 RX ADMIN — HYDRALAZINE HYDROCHLORIDE 10 MG: 20 INJECTION INTRAMUSCULAR; INTRAVENOUS at 05:51

## 2022-09-19 RX ADMIN — SENNOSIDES AND DOCUSATE SODIUM 1 TABLET: 50; 8.6 TABLET ORAL at 08:18

## 2022-09-19 RX ADMIN — POLYETHYLENE GLYCOL 3350 17 G: 17 POWDER, FOR SOLUTION ORAL at 08:18

## 2022-09-19 RX ADMIN — SENNOSIDES AND DOCUSATE SODIUM 1 TABLET: 50; 8.6 TABLET ORAL at 19:26

## 2022-09-19 RX ADMIN — HEPARIN SODIUM 5000 UNITS: 5000 INJECTION, SOLUTION INTRAVENOUS; SUBCUTANEOUS at 22:17

## 2022-09-19 RX ADMIN — HEPARIN SODIUM 5000 UNITS: 5000 INJECTION, SOLUTION INTRAVENOUS; SUBCUTANEOUS at 14:07

## 2022-09-19 RX ADMIN — Medication 50 MCG: at 16:09

## 2022-09-19 RX ADMIN — LABETALOL HYDROCHLORIDE 10 MG: 5 INJECTION, SOLUTION INTRAVENOUS at 03:01

## 2022-09-19 RX ADMIN — MULTIPLE VITAMINS W/ MINERALS TAB 1 TABLET: TAB at 16:09

## 2022-09-19 RX ADMIN — FAMOTIDINE 20 MG: 40 POWDER, FOR SUSPENSION ORAL at 22:30

## 2022-09-19 RX ADMIN — THIAMINE HCL TAB 100 MG 100 MG: 100 TAB at 16:09

## 2022-09-19 ASSESSMENT — ACTIVITIES OF DAILY LIVING (ADL)
ADLS_ACUITY_SCORE: 22

## 2022-09-19 NOTE — PLAN OF CARE
Orientation/Cognitive: A/Ox4   Observation Goals (Met/ Not Met): Inpatient   Mobility Level/Assist Equipment: SBA   Fall Risk (Y/N): Yes   Behavior Concerns: None  Pain Management: Denies   Tele/VS/O2: HTN; on RA;   ABNL Lab/BG: See chart   Diet: Liquid diet  Bowel/Bladder: Continent.   Skin Concerns: RUTH drain CDI  Drains/Devices: PIV infusing 50ml/hr  Tests/Procedures for next shift: Diagnostic laparoscopy   Anticipated DC date & active delays: TBD   Patient Stated Goal for Today: Sleep

## 2022-09-19 NOTE — PROGRESS NOTES
Interventional Radiology Note  Inpatient - [facility]  9/19/2022    A:  ASSESSMENT:  46 year old female with perigastric fluid collection (thought to be secondary to postoperative hematoma) with dehydration, nausea, vomiting and GERD. S/P IR drain placement 9/15/22    P:    Note IR drain was removed in the OR yesterday during Diagnostic laparoscopy, drainage of perigastric intra-abdominal hematoma  -IR will sign off      Alysha Leach PA-C  Interventional Radiology  [facility] [place] desk phone *[phone]

## 2022-09-19 NOTE — PROGRESS NOTES
"GENERAL SURGERY PROGRESS NOTE    SUBJECTIVE:  Patient seen and examined this AM. Reports improvement in nausea and vomiting since laparoscopic drainage yesterday; began taking sips of clear liquids and jello overnight. OOB and ambulated postop per patient report. RUTH drains with 285 ml serosanguineous output total since surgery-- RUQ drain with some drainage around insertion, dressing reinforced. Still with hypertension despite PRN medications for HTN.    OBJECTIVE:  Vital signs:  Temp: 98.3  F (36.8  C) Temp src: Oral BP: (!) 176/95 Pulse: 90   Resp: 16 SpO2: 100 % O2 Device: None (Room air) Oxygen Delivery: 2 LPM      Estimated body mass index is 31.8 kg/m  as calculated from the following:    Height as of 6/29/22: 1.803 m (5' 11\").    Weight as of 9/6/22: 103.4 kg (228 lb).      GENERAL: NAD  RESPIRATORY: No dyspnea  CARDIOVASCULAR: RRR  ABDOMEN: soft, nondistended, appropriately tender to palpation autumn-incisionally; incisions c/d/i with skin glue in place. LUQ and RUQ RUTH drains with serosanguineous output in bulbs  EXTREMITIES: No edema or tenderness    PERTINENT LABS or IMAGING:  K 2.8 this AM on metabolic panel. WBC 13.2, likely reactive from recent surgery. Rest of labs reviewed, unremarkable.    ASSESSMENT:  46 year old female with perigastric fluid collection (secondary to postoperative hematoma), s/p percutaneous drain placement on 9/15/22 by Interventional Radiology and diagnostic laparoscopy with drainage of intra-abdominal hematoma on 9/18/22. Nausea, vomiting and acid reflux improving. Dehydration resolved. Persistent hypertension during inpatient admission.     PLAN:  - Strip, empty and measure drainage from each RUTH drain qshift; patient will need drain teaching prior to going home  - Continue clear liquid diet; encouraged patient to drink this morning and aim for small sips at a time, 1 fl oz/15 minutes with goal of 4 fl oz/hr  - Antiemetics PRN; patient has been refusing as she believed blurry " vision secondary to medications, however, has not received any antiemetics in several days and continues to have some blurry vision. This maybe secondary to persistent hypertension-- hospitalist following for HTN, appreciate PO regimen recommendations for anticipated discharge home  - Continue famotidine BID for GERD symptoms; added Carafate AC QID  - Encouraged OOB activity; continue VTE ppx with SQH and SCDs while in bed  - Discharge pending improvement in PO intake and better control of HTN control with PO regimen    CRISTINA OLIVA MD on 9/19/2022 at 7:43 AM  Minimally Invasive & Bariatric Surgery   [facility]

## 2022-09-19 NOTE — PROGRESS NOTES
[facility]    Medicine Progress Note - Hospitalist Service       Date of Admission:  9/14/2022    Assessment & Plan   Tari Nicolas is a 46 year old female with hx of recent gastric sleeve procedure who was admitted from GI clinic to the General Surgery service on 9/14/2022 for intractable nausea/vomiting attributed to gastric outlet obstruction from intra-abdominal hematoma. Hospitalist service was consulted for elevated blood pressures    Intractable nausea/vomiting due to gastric outlet obstruction  Perigastric intra-abdominal hematoma s/p laparoscopic drainage on 9/18/22  Morbid obesity s/p gastric sleeve (06/2022)  * 06/2022: Underwent gastric sleeve procedure at [facility] 06/2022. Procedure was complicated by acute blood loss anemia which was treated with iron. Was doing reasonably well at her 6 week follow-up appointment  * 9/6/22: Presented to the ED with ongoing abdominal pain, nausea/vomiting/diarrhea. CT abd/pelvis at that time showed large fluid collection suggestive of hematoma. Case was discussed with GI; given that patient's symptoms improved with supportive cares, it was recommended that she discharge home with short-term follow up  * 9/14/22: Presented to GI clinic with worsening nausea/vomiting, and was referred to the ED due to concern for gastric outlet obstruction from autumn-gastric fluid collection. General Surgery was notified in the ED, and IR was consulted for percutaneous drainage of hematoma.   * 9/15: Underwent drain placement by IR without significant improvement in symptoms  * 9/18: Underwent laparoscopic drainage of hematoma by Gen Surgery  - Post-op cares as per Gen Surgery  - On famotidine BID and sucralfate QID, anti-emetics prn as per Gen Surgery    Elevated blood pressures  Blurry vision  * Patient developed blurry vision this admission in setting of elevated BPs to 170s systolic noted this admission  * Patient has no prior history of hypertension.  Suspect elevated blood pressures are related to acute illness with ongoing nausea/vomiting  * Head CT (9/18) negative for acute intracranial pathology  - Start carvedilol 12.5 mg BID today, 9/19  - Labetalol 10 mg IV q2h prn ordered for SBP>170. Second-line agent, IV hydralazine also available  - Follow up with PCP within 1 week of discharge for BP check    Hypokalemia  * Due to GI losses  - K replacement protocol ordered  - Check Mg and Phos     Diet: Clear Liquid Diet    DVT Prophylaxis: Defer to primary service  Zavaleta Catheter: Not present  Code Status: Full Code         Disposition: Expected discharge as per General Surgery once tolerating adequate PO    The patient's care was discussed with the Patient.    Rylee Wharton MD  Hospitalist Service  [facility]  Contact information available via [facility] Paging/Directory    ______________________________________________________________________    Interval History   Underwent surgery last night. Nausea/vomiting somewhat improved, though continues to have poor appetite, poor PO intake. Also continues to have persistent blurry vision. General Surgery wants to initiate PO antihypertensive  - Start carvedilol (tried amlodipine earlier this stay without significant improvement). Would avoid ACEi/ARB/thiazide in setting of poor PO intake  - K replacement protocol ordered. Check Mg and Phos    Rylee Wharton MD    Data reviewed today: I reviewed all medications, new labs and imaging results over the last 24 hours. I personally reviewed no images or EKG's today.    Physical Exam   Vital Signs: Temp: 98.3  F (36.8  C) Temp src: Oral BP: (!) 176/95 Pulse: 90   Resp: 16 SpO2: 100 % O2 Device: None (Room air)    Weight: 0 lbs 0 oz  Constitutional: Resting in bed, NAD  HEENT: Sclera white, MMM  Respiratory: Breathing non-labored. Lungs CTAB - no wheezes, crackles, or rhonchi  Cardiovascular: Heart RRR, no m/r/g. No pedal edema  GI: +BS, abd  soft/NT  Skin/Integument: No rash  Musculoskeletal: Normal muscle bulk and tone  Neuro: Alert and appropriate, VALLE  Psych: Calm and cooperative    Data   Recent Labs   Lab 09/19/22  0706 09/18/22  1326 09/16/22  0705 09/15/22  2110 09/15/22  1100 09/15/22  0625   WBC 13.2*  --   --   --   --  11.6*   HGB 11.7  --   --   --   --  11.7   MCV 78  --   --   --   --  83    213  --   --   --  240   INR  --   --   --   --  1.19*  --      --   --   --   --  135   POTASSIUM 2.8*  --   --   --   --  3.4   CHLORIDE 103  --   --   --   --  101   CO2 21  --   --   --   --  24   BUN 5*  --   --   --   --  7   CR 0.52  --   --   --   --  0.56   ANIONGAP 13  --   --   --   --  10   MAHAD 9.2  --   --   --   --  9.3   *  --  98 104*  --  90   ALBUMIN  --   --   --   --   --  3.2*   PROTTOTAL  --   --   --   --   --  6.9   BILITOTAL  --   --   --   --   --  1.3   ALKPHOS  --   --   --   --   --  84   ALT  --   --   --   --   --  22   AST  --   --   --   --   --  14         Recent Results (from the past 24 hour(s))   CT Head w/o Contrast    Narrative    EXAM: CT HEAD W/O CONTRAST  LOCATION: [facility]  DATE/TIME: 9/18/2022 3:36 PM    INDICATION: Blurry vision  COMPARISON: None.  TECHNIQUE: Routine CT Head without IV contrast. Multiplanar reformats. Dose reduction techniques were used.    FINDINGS:  INTRACRANIAL CONTENTS: No intracranial hemorrhage, extraaxial collection, or mass effect.  No CT evidence of acute infarct. Normal parenchymal attenuation. Normal ventricles and sulci.     VISUALIZED ORBITS/SINUSES/MASTOIDS: No intraorbital abnormality. No paranasal sinus mucosal disease. No middle ear or mastoid effusion.    BONES/SOFT TISSUES: No acute abnormality.      Impression    IMPRESSION:  1.  Normal head CT.       Medications       carvedilol  12.5 mg Oral BID w/meals     famotidine  20 mg Oral BID     heparin ANTICOAGULANT  5,000 Units Subcutaneous Q8H     polyethylene glycol  17 g Oral  Daily     potassium chloride  40 mEq Oral BID     senna-docusate  1 tablet Oral BID     sodium chloride (PF)  10 mL Irrigation Q8H     sodium chloride (PF)  3 mL Intracatheter Q8H     sodium chloride (PF)  3 mL Intracatheter Q8H     sodium chloride (PF)  3 mL Intracatheter Q8H     sucralfate  1 g Oral 4x Daily AC & HS

## 2022-09-20 VITALS
TEMPERATURE: 97.8 F | SYSTOLIC BLOOD PRESSURE: 161 MMHG | WEIGHT: 225.5 LBS | BODY MASS INDEX: 31.57 KG/M2 | HEIGHT: 71 IN | HEART RATE: 87 BPM | DIASTOLIC BLOOD PRESSURE: 101 MMHG | OXYGEN SATURATION: 98 % | RESPIRATION RATE: 17 BRPM

## 2022-09-20 LAB
ANION GAP SERPL CALCULATED.3IONS-SCNC: 7 MMOL/L (ref 3–14)
BACTERIA ASPIRATE CULT: NO GROWTH
BUN SERPL-MCNC: 10 MG/DL (ref 7–30)
CALCIUM SERPL-MCNC: 9.2 MG/DL (ref 8.5–10.1)
CHLORIDE BLD-SCNC: 104 MMOL/L (ref 94–109)
CO2 SERPL-SCNC: 27 MMOL/L (ref 20–32)
CREAT SERPL-MCNC: 0.53 MG/DL (ref 0.52–1.04)
GFR SERPL CREATININE-BSD FRML MDRD: >90 ML/MIN/1.73M2
GLUCOSE BLD-MCNC: 106 MG/DL (ref 70–99)
GLUCOSE BLDC GLUCOMTR-MCNC: 106 MG/DL (ref 70–99)
POTASSIUM BLD-SCNC: 2.9 MMOL/L (ref 3.4–5.3)
SODIUM SERPL-SCNC: 138 MMOL/L (ref 133–144)

## 2022-09-20 PROCEDURE — 250N000013 HC RX MED GY IP 250 OP 250 PS 637

## 2022-09-20 PROCEDURE — 250N000013 HC RX MED GY IP 250 OP 250 PS 637: Performed by: INTERNAL MEDICINE

## 2022-09-20 PROCEDURE — 99238 HOSP IP/OBS DSCHRG MGMT 30/<: CPT

## 2022-09-20 PROCEDURE — 250N000011 HC RX IP 250 OP 636: Performed by: SURGERY

## 2022-09-20 PROCEDURE — 250N000013 HC RX MED GY IP 250 OP 250 PS 637: Performed by: SURGERY

## 2022-09-20 PROCEDURE — 80048 BASIC METABOLIC PNL TOTAL CA: CPT | Performed by: INTERNAL MEDICINE

## 2022-09-20 PROCEDURE — 36415 COLL VENOUS BLD VENIPUNCTURE: CPT | Performed by: INTERNAL MEDICINE

## 2022-09-20 PROCEDURE — 84425 ASSAY OF VITAMIN B-1: CPT | Performed by: SURGERY

## 2022-09-20 PROCEDURE — 36415 COLL VENOUS BLD VENIPUNCTURE: CPT | Performed by: SURGERY

## 2022-09-20 PROCEDURE — 84630 ASSAY OF ZINC: CPT | Performed by: SURGERY

## 2022-09-20 RX ORDER — POTASSIUM CHLORIDE 1500 MG/1
20 TABLET, EXTENDED RELEASE ORAL ONCE
Qty: 1 TABLET | Refills: 0 | Status: SHIPPED | OUTPATIENT
Start: 2022-09-20 | End: 2022-09-20

## 2022-09-20 RX ORDER — CARVEDILOL 12.5 MG/1
12.5 TABLET ORAL 2 TIMES DAILY WITH MEALS
Qty: 60 TABLET | Refills: 0 | Status: SHIPPED | OUTPATIENT
Start: 2022-09-20 | End: 2022-10-20

## 2022-09-20 RX ORDER — POTASSIUM CHLORIDE 1500 MG/1
40 TABLET, EXTENDED RELEASE ORAL ONCE
Status: COMPLETED | OUTPATIENT
Start: 2022-09-20 | End: 2022-09-20

## 2022-09-20 RX ORDER — SUCRALFATE ORAL 1 G/10ML
1 SUSPENSION ORAL
Qty: 1200 ML | Refills: 0 | Status: SHIPPED | OUTPATIENT
Start: 2022-09-20 | End: 2022-10-20

## 2022-09-20 RX ORDER — FAMOTIDINE 40 MG/5ML
20 POWDER, FOR SUSPENSION ORAL 2 TIMES DAILY
Qty: 150 ML | Refills: 0 | Status: SHIPPED | OUTPATIENT
Start: 2022-09-20 | End: 2022-10-20

## 2022-09-20 RX ORDER — POTASSIUM CHLORIDE 1500 MG/1
20 TABLET, EXTENDED RELEASE ORAL ONCE
Status: DISCONTINUED | OUTPATIENT
Start: 2022-09-20 | End: 2022-09-20 | Stop reason: HOSPADM

## 2022-09-20 RX ORDER — CHOLECALCIFEROL (VITAMIN D3) 50 MCG
50 TABLET ORAL DAILY
Qty: 30 TABLET | Refills: 0 | Status: SHIPPED | OUTPATIENT
Start: 2022-09-21 | End: 2022-10-21

## 2022-09-20 RX ORDER — POTASSIUM CHLORIDE 1500 MG/1
40 TABLET, EXTENDED RELEASE ORAL ONCE
Qty: 2 TABLET | Refills: 0 | Status: SHIPPED | OUTPATIENT
Start: 2022-09-20 | End: 2022-09-20

## 2022-09-20 RX ORDER — TRAMADOL HYDROCHLORIDE 50 MG/1
50 TABLET ORAL EVERY 6 HOURS PRN
Qty: 20 TABLET | Refills: 0 | Status: SHIPPED | OUTPATIENT
Start: 2022-09-20 | End: 2022-09-25

## 2022-09-20 RX ADMIN — THIAMINE HCL TAB 100 MG 100 MG: 100 TAB at 09:09

## 2022-09-20 RX ADMIN — HEPARIN SODIUM 5000 UNITS: 5000 INJECTION, SOLUTION INTRAVENOUS; SUBCUTANEOUS at 06:44

## 2022-09-20 RX ADMIN — SUCRALFATE ORAL 1 G: 1 SUSPENSION ORAL at 10:36

## 2022-09-20 RX ADMIN — MULTIPLE VITAMINS W/ MINERALS TAB 1 TABLET: TAB at 10:35

## 2022-09-20 RX ADMIN — POTASSIUM CHLORIDE 40 MEQ: 1500 TABLET, EXTENDED RELEASE ORAL at 12:24

## 2022-09-20 RX ADMIN — SENNOSIDES AND DOCUSATE SODIUM 1 TABLET: 50; 8.6 TABLET ORAL at 09:09

## 2022-09-20 RX ADMIN — Medication 50 MCG: at 09:13

## 2022-09-20 RX ADMIN — CARVEDILOL 12.5 MG: 12.5 TABLET, FILM COATED ORAL at 09:13

## 2022-09-20 RX ADMIN — SUCRALFATE ORAL 1 G: 1 SUSPENSION ORAL at 06:44

## 2022-09-20 RX ADMIN — FAMOTIDINE 20 MG: 40 POWDER, FOR SUSPENSION ORAL at 09:10

## 2022-09-20 ASSESSMENT — ACTIVITIES OF DAILY LIVING (ADL)
ADLS_ACUITY_SCORE: 22

## 2022-09-20 NOTE — PLAN OF CARE
Orientation/Cognitive: A&O. Flat  Observation Goals (Met/ Not Met): Inp  Mobility Level/Assist Equipment: A1 gb IVpole  Fall Risk (Y/N): Y  Behavior Concerns: N  Pain Management: denies  Tele/VS/O2: HTN, RA prn avail  ABNL Lab/BG: . K 2.9, replaced. Provider aware   Diet: Clear liq. Lots of encouragement provided.   Bowel/Bladder: cont. Oliguria, provider aware  Skin Concerns: RUTH and lap sites cdi, education provided  Drains/Devices: 2 RUTH  Tests/Procedures for next shift: follow up Dr. Santillan  Anticipated DC date & active delays: 9-20  Patient Stated Goal for Today: lynne flat    Education provided rt RUTH care drainage documentation and stripping etc, wound/incision care, clear liq. Extra information printed and given to pt and   AVS reviewed. Meds given. Pt in agreement. Discharged home w

## 2022-09-20 NOTE — PROGRESS NOTES
[facility] General Surgery Post-Op / Progress Note         Assessment and Plan:    Assessment:   Post-operative day #2  Drainage of intra-abdominal hematoma     Doing well.  Clean wound without signs of infection.  No immediate surgical complications identified.  Tolerating diet a little better.  Nausea resolved.  Able to tolerate more p.o. intake.      Plan:   Will encourage p.o. intake.  Ambulate in the hallway.  Possible discharge home later today if tolerating diet better.           Interval History:   Doing well.  Continues to improve.  Pain is well-controlled.  No fevers.  Tolerating diet better          Significant Problems:    Active Problems:    Dehydration    Nausea and vomiting    Fluid collection at surgical site            Review of Systems:    The patient denies any chest pain, shortness of breath, excessive pain, fever, chills, purulent drainage from the wound, nausea or vomiting.          Medications:   All medications related to the patient's surgery have been reviewed          Physical Exam:   All vitals stable  Wound clean and dry with minimal or no drainage.  Surrounding skin with minimal erythema.  Dressing dry and intact.          Data:   All laboratory data related to this surgery reviewed  No imaging studies have been ordered       Mariano Graves MD on 9/20/2022 at 12:43 PM   Minimally Invasive & Bariatric Surgery  [facility]

## 2022-09-20 NOTE — PROVIDER NOTIFICATION
MD Notification    Notified Person: MD    Notified Person Name:Rosalba Hunter DO    Notification Date/Time: 9-20 1213    Notification Interaction: 3sun message    Purpose of NotificationK 2.9. Just replaced. Should we discharge now? Or discharge after K recheck? Pt is all ready to leave  Orders Received:    Comments:

## 2022-09-20 NOTE — PLAN OF CARE
Orientation/Cognitive: A/Ox4   Observation Goals (Met/ Not Met): Inpatient   Mobility Level/Assist Equipment: X1 GB to BSC or BR (dizziness when sit up at times)  Fall Risk (Y/N): Yes   Behavior Concerns: None  Pain Management: Denies pain  Tele/VS/O2: HTN; on RA;   ABNL Lab/BG: K+ 2.8, WBC 13.2   Diet: Liquid diet  Bowel/Bladder: Continent.   Skin Concerns: RUTH drains CDI  Drains/Devices: PIV infusing 50ml/hr  Tests/Procedures for next shift: tbd  Anticipated DC date & active delays: tbd    Pt affect flat and lethargic much of the day. Encouraged fluids but pt did not drink more than 120 mL entire day-paged to re-start IV fluids at 2200. Denies pain. Drains 3mL and 4 mL out today dark red.     I would recommend a Psych consult. PT may be very depressed- verbally agreed.

## 2022-09-20 NOTE — PROVIDER NOTIFICATION
MD Notification    Notified Person: MD    Notified Person Name:Jana Alex NP    Notification Date/Time:0920 9-20    Notification Interaction: vocera message    Purpose of Notification:Fyi pt refused potassium packets. Prefers pills. Please order if approp.    Orders Received:    Comments:

## 2022-09-20 NOTE — PROGRESS NOTES
SPIRITUAL HEALTH SERVICES  SPIRITUAL ASSESSMENT Progress Note  [facility]. [address] short stay     REFERRAL SOURCE: follow up from on call support.    Check-in with Tari; introduction as unit . Pt declined support today and stated preference to make further request via nursing staff.    Spiritual Health remains available at patient's request for the duration of admission.    Brea Rollins MDiv    Pager [phone]  Mobile [phone]  [facility] remains available 24/7 for emergent requests/referrals, either by having the on-call  paged or by entering an ASAP/STAT consult in Epic (this will also page the on-call ). Routine Epic consults receive an initial response within 24 hours.

## 2022-09-20 NOTE — DISCHARGE SUMMARY
[facility]  Hospitalist Discharge Summary      Date of Admission:  9/14/2022  Date of Discharge:  9/20/2022  Discharging Provider: Jana Alex NP  Discharge Service: Hospitalist Service    Discharge Diagnoses   1. Gastroesophageal reflux disease without esophagitis    2. Fluid collection at surgical site, subsequent encounter    3. Fluid collection at surgical site, initial encounter    4. Hypertension, unspecified type    5. H/O gastric sleeve        Follow-ups Needed After Discharge       Unresulted Labs Ordered in the Past 30 Days of this Admission     Date and Time Order Name Status Description    9/20/2022 12:02 AM Zinc In process     9/20/2022 12:02 AM Vitamin B1 plasma In process     9/15/2022  9:42 AM Anaerobic Bacterial Culture Routine Preliminary       These results will be followed up by Dr. Santillan    Discharge Disposition   Discharged to home  Condition at discharge: Good    Hospital Course   Tari Nicolas is a 46 year old female with hx of recent gastric sleeve procedure who was admitted from GI clinic to the General Surgery service on 9/14/2022 for intractable nausea/vomiting attributed to gastric outlet obstruction from intra-abdominal hematoma. Hospitalist service was consulted for elevated blood pressures    Intractable nausea/vomiting due to gastric outlet obstruction  Perigastric intra-abdominal hematoma s/p laparoscopic drainage on 9/18/22  Morbid obesity s/p gastric sleeve (06/2022)  * 06/2022: Underwent gastric sleeve procedure at [facility] 06/2022. Procedure was complicated by acute blood loss anemia which was treated with iron. Was doing reasonably well at her 6 week follow-up appointment  * 9/6/22: Presented to the ED with ongoing abdominal pain, nausea/vomiting/diarrhea. CT abd/pelvis at that time showed large fluid collection suggestive of hematoma. Case was discussed with GI; given that patient's symptoms improved with supportive cares, it was  recommended that she discharge home with short-term follow up  * 9/14/22: Presented to GI clinic with worsening nausea/vomiting, and was referred to the ED due to concern for gastric outlet obstruction from autumn-gastric fluid collection. General Surgery was notified in the ED, and IR was consulted for percutaneous drainage of hematoma.   * 9/15: Underwent drain placement by IR without significant improvement in symptoms  * 9/18: Underwent laparoscopic drainage of hematoma by Gen Surgery  - Post-op cares as per Gen Surgery  - On famotidine BID and sucralfate QID, anti-emetics prn     Elevated blood pressures  Blurry vision  * Patient developed blurry vision this admission in setting of elevated BPs to 170s systolic noted this admission  * Patient has no prior history of hypertension. Suspect elevated blood pressures are related to acute illness with ongoing nausea/vomiting  * Head CT (9/18) negative for acute intracranial pathology  - Start carvedilol 12.5 mg BID today, 9/19  - Labetalol 10 mg IV q2h prn ordered for SBP>170. Second-line agent, IV hydralazine also available  - Follow up with PCP within 1 week of discharge for BP check    Hypokalemia  * Due to GI losses  - K replacement protocol ordered  - Check Mg and Phos    Consultations This Hospital Stay   SOCIAL WORK IP CONSULT  CARE MANAGEMENT / SOCIAL WORK IP CONSULT  VASCULAR ACCESS ADULT IP CONSULT  HOSPITALIST IP CONSULT    Code Status   Full Code    Time Spent on this Encounter   I, Jana Alex NP, personally saw the patient today and spent less than or equal to 30 minutes discharging this patient.       Jana Alex NP  [facility] EXTENDED RECOVERY AND SHORT STAY  [address] [place] [id number]  Phone: [phone]  ______________________________________________________________________    Physical Exam   Vital Signs: Temp: 97.8  F (36.6  C) Temp src: Oral BP: (!) 161/101 Pulse: 87   Resp: 17 SpO2: 98 % O2  Device: None (Room air)    Weight: 225 lbs 8 oz  General Appearance: Alert, oriented; in NAD  Respiratory: chest symmetric with easy respirations bilaterrally; lung sounds clear and equal.   Cardiovascular: RRR  GI: bowel sounds positive in all quadrants; soft, non-distended, mild tenderness with palpation of LUQ only.  Skin: warm, dry  Other: RUTH drain site dry; drain intact        Primary Care Physician   Sharon Jama    Discharge Orders      Reason for your hospital stay    Perigastric intraabdominal hematoma with surgical evacuation     Follow-up and recommended labs and tests     Follow up with Dr. Santillan , at one of his locations within 7 days  for hospital follow- up. No follow up labs or test are needed.     Activity    Your activity upon discharge: activity as tolerated and no driving for today     Diet    Follow this diet upon discharge: Orders Placed This Encounter      Clear Liquid Diet       Significant Results and Procedures   Most Recent 3 CBC's:Recent Labs   Lab Test 09/19/22  0706 09/18/22  1326 09/15/22  0625 09/06/22  1652   WBC 13.2*  --  11.6* 12.5*   HGB 11.7  --  11.7 13.3   MCV 78  --  83 84    213 240 277     Most Recent 3 BMP's:Recent Labs   Lab Test 09/20/22  0900 09/20/22  0612 09/19/22  2256 09/19/22  0706 09/15/22  2110 09/15/22  0625     --   --  137  --  135   POTASSIUM 2.9*  --  3.8 2.8*  --  3.4   CHLORIDE 104  --   --  103  --  101   CO2 27  --   --  21  --  24   BUN 10  --   --  5*  --  7   CR 0.53  --   --  0.52  --  0.56   ANIONGAP 7  --   --  13  --  10   MAHAD 9.2  --   --  9.2  --  9.3   * 106*  --  155*   < > 90    < > = values in this interval not displayed.   ,   Results for orders placed or performed during the hospital encounter of 09/14/22   CT Abdomen Peritonium Abscess Drainage    Narrative    CT ABDOMEN PERITONEUM ABSCESS DRAIN W CATH PLACE September 15, 2022  9:41 AM     HISTORY: Probable hematoma in the left upper quadrant.  Percutaneous  drainage requested by primary service.    COMPARISON: September 6, 2022.     TECHNIQUE: Volumetric helical acquisition of CT images were acquired  without contrast. Radiation dose for this scan was reduced using  automated exposure control, adjustment of the mA and/or kV according  to patient size, or iterative reconstruction technique.    MEDICATIONS: 10mL Lidocaine 1%, 1mg Versed, 50mcg Fentanyl given over  10 minutes. RN: Ekaterina Woodard.    FINDINGS: After written and oral informed consent was obtained, and  pause for the cause correctly identified the patient and procedure,  the patient was scanned in supine position for procedural planning.  The subcutaneous tissues overlying the probable hematoma in the left  upper quadrant was identified, marked, prepped and draped in the usual  sterile fashion, and anesthetized with 10 mL of 1% subcutaneous  lidocaine. The fluid collection was accessed with a hollow bore  needle, and a small amount of bloody material was aspirated. An 0.035  floppy tipped wire was advanced into the fluid collection and the  needle was removed over the wire. The tract was serially dilated.  12  Kinyarwanda locking pigtail catheter was advanced into the abscess and its  position was verified with postprocedural imaging. The catheter was  fixed to the overlying skin using an adhesive bandage. There were no  immediate complications and patient left the CT suite in satisfactory  condition.     CONSCIOUS SEDATION NOTE: After obtaining consent for sedation,  conscious sedation was induced using IV Versed and IV fentanyl.  Patient was monitored by nurse under my direct supervision throughout  the exam. There were no complications of the sedation. 15 minutes  face-to-face time.      Impression    IMPRESSION:   1.Technically successful CT guided probable hematoma drain placement.  If saline flushing does not yield significant output's, consider TPA  infusion to liquefy the hematoma.  2.  Conscious sedation.    JOSE EDUARDO HILLS MD         SYSTEM ID:  [id number]   CT Head w/o Contrast    Narrative    EXAM: CT HEAD W/O CONTRAST  LOCATION: [facility]  DATE/TIME: 9/18/2022 3:36 PM    INDICATION: Blurry vision  COMPARISON: None.  TECHNIQUE: Routine CT Head without IV contrast. Multiplanar reformats. Dose reduction techniques were used.    FINDINGS:  INTRACRANIAL CONTENTS: No intracranial hemorrhage, extraaxial collection, or mass effect.  No CT evidence of acute infarct. Normal parenchymal attenuation. Normal ventricles and sulci.     VISUALIZED ORBITS/SINUSES/MASTOIDS: No intraorbital abnormality. No paranasal sinus mucosal disease. No middle ear or mastoid effusion.    BONES/SOFT TISSUES: No acute abnormality.      Impression    IMPRESSION:  1.  Normal head CT.       Discharge Medications   Current Discharge Medication List      START taking these medications    Details   carvedilol (COREG) 12.5 MG tablet Take 1 tablet (12.5 mg) by mouth 2 times daily (with meals) for 30 days  Qty: 60 tablet, Refills: 0    Associated Diagnoses: Hypertension, unspecified type      famotidine (PEPCID) 40 MG/5ML suspension Take 2.5 mLs (20 mg) by mouth 2 times daily for 30 days  Qty: 150 mL, Refills: 0    Associated Diagnoses: Gastroesophageal reflux disease without esophagitis      polyethylene glycol (MIRALAX) 17 GM/Dose powder Take 17 g by mouth daily as needed for constipation  Qty: 510 g, Refills: 0    Associated Diagnoses: Fluid collection at surgical site, subsequent encounter      !! potassium chloride ER (KLOR-CON M) 20 MEQ CR tablet Take 2 tablets (40 mEq) by mouth once for 1 dose  Qty: 2 tablet, Refills: 0    Associated Diagnoses: H/O gastric sleeve      !! potassium chloride ER (KLOR-CON M) 20 MEQ CR tablet Take 1 tablet (20 mEq) by mouth once for 1 dose  Qty: 1 tablet, Refills: 0    Associated Diagnoses: H/O gastric sleeve      sucralfate (CARAFATE) 1 GM/10ML suspension Take 10 mLs (1 g)  by mouth 4 times daily (before meals and nightly) for 30 days  Qty: 1200 mL, Refills: 0    Associated Diagnoses: H/O gastric sleeve      traMADol (ULTRAM) 50 MG tablet Take 1 tablet (50 mg) by mouth every 6 hours as needed for moderate pain or severe pain  Qty: 20 tablet, Refills: 0    Associated Diagnoses: Fluid collection at surgical site, initial encounter      vitamin D3 (CHOLECALCIFEROL) 50 mcg (2000 units) tablet Take 1 tablet (50 mcg) by mouth daily for 30 days  Qty: 30 tablet, Refills: 0    Associated Diagnoses: H/O gastric sleeve       !! - Potential duplicate medications found. Please discuss with provider.      STOP taking these medications       acetaminophen (TYLENOL) 325 MG tablet Comments:   Reason for Stopping:         metoclopramide (REGLAN) 5 MG tablet Comments:   Reason for Stopping:         pantoprazole (PROTONIX) 40 MG EC tablet Comments:   Reason for Stopping:             Allergies   Allergies   Allergen Reactions     Ciprofloxacin Hives     Hydrocodone Dizziness     Oxycodone Dizziness

## 2022-09-20 NOTE — PROVIDER NOTIFICATION
Paged hospitalist because pt is so quiet, flat and not eating or drinking. Asked for IV fluids to be restarted.

## 2022-09-20 NOTE — PROVIDER NOTIFICATION
MD Notification    Notified Person: MD    Notified Person Name:Jana Alex NP    Notification Date/Time: 9-20 1213    Notification Interaction: AA Party message    Purpose of NotificationK 2.9. Ordered tablets now. Should we discharge after K tablets given? Or discharge after K recheck?  Orders Received:May discharge after K is given    Comments:

## 2022-09-20 NOTE — PLAN OF CARE
Goal Outcome Evaluation:      Orientation/Cognitive: A&Ox4  Observation Goals (Met/ Not Met): Inpatient  Mobility Level/Assist Equipment: Ax1, GB, Walker to BSC  Fall Risk (Y/N):Y  Behavior Concerns: Flat affect  Pain Management:Denies pain   Tele/VS/O2: VSS on RA except elevated BP  ABNL Lab/BG:K:3.8 WBC:13.2 B  Diet:Clear liquid   Bowel/Bladder: Low urine output, continent  Skin Concerns: Bilat abd RUTH drain  Drains/Devices: Bilat RUTH drain  Tests/Procedures for next shift: AM labs   Anticipated DC date & active delays: TBD pending improvement P.O intake and better BP control   Patient Stated Goal for Today: To sleep

## 2022-09-22 LAB
BACTERIA ASPIRATE CULT: NORMAL
VIT B1 SERPL-SCNC: 6 NMOL/L

## 2022-09-23 LAB — ZINC SERPL-MCNC: 88.9 UG/DL

## 2022-12-03 ENCOUNTER — HOSPITAL ENCOUNTER (EMERGENCY)
Facility: CLINIC | Age: 47
Discharge: HOME | End: 2022-12-03
Attending: EMERGENCY MEDICINE | Admitting: EMERGENCY MEDICINE

## 2022-12-03 ENCOUNTER — APPOINTMENT (OUTPATIENT)
Dept: CT IMAGING | Facility: CLINIC | Age: 47
End: 2022-12-03
Attending: EMERGENCY MEDICINE

## 2022-12-03 VITALS
TEMPERATURE: 96.5 F | HEART RATE: 99 BPM | DIASTOLIC BLOOD PRESSURE: 91 MMHG | SYSTOLIC BLOOD PRESSURE: 114 MMHG | OXYGEN SATURATION: 100 % | RESPIRATION RATE: 18 BRPM

## 2022-12-03 DIAGNOSIS — R11.10 VOMITING, UNSPECIFIED VOMITING TYPE, UNSPECIFIED WHETHER NAUSEA PRESENT: ICD-10-CM

## 2022-12-03 LAB
ALBUMIN SERPL BCG-MCNC: 4.2 G/DL (ref 3.5–5.2)
ALBUMIN UR-MCNC: 50 MG/DL
ALP SERPL-CCNC: 92 U/L (ref 35–104)
ALT SERPL W P-5'-P-CCNC: 31 U/L (ref 10–35)
ANION GAP SERPL CALCULATED.3IONS-SCNC: 24 MMOL/L (ref 7–15)
APPEARANCE UR: CLEAR
AST SERPL W P-5'-P-CCNC: 40 U/L (ref 10–35)
BASOPHILS # BLD AUTO: 0.1 10E3/UL (ref 0–0.2)
BASOPHILS NFR BLD AUTO: 0 %
BILIRUB DIRECT SERPL-MCNC: 0.23 MG/DL (ref 0–0.3)
BILIRUB SERPL-MCNC: 1.2 MG/DL
BILIRUB UR QL STRIP: ABNORMAL
BUN SERPL-MCNC: 8.5 MG/DL (ref 6–20)
CALCIUM SERPL-MCNC: 9.9 MG/DL (ref 8.6–10)
CHLORIDE SERPL-SCNC: 90 MMOL/L (ref 98–107)
COLOR UR AUTO: ABNORMAL
CREAT SERPL-MCNC: 0.63 MG/DL (ref 0.51–0.95)
DEPRECATED HCO3 PLAS-SCNC: 23 MMOL/L (ref 22–29)
EOSINOPHIL # BLD AUTO: 0.1 10E3/UL (ref 0–0.7)
EOSINOPHIL NFR BLD AUTO: 1 %
ERYTHROCYTE [DISTWIDTH] IN BLOOD BY AUTOMATED COUNT: 17.8 % (ref 10–15)
GFR SERPL CREATININE-BSD FRML MDRD: >90 ML/MIN/1.73M2
GLUCOSE SERPL-MCNC: 131 MG/DL (ref 70–99)
GLUCOSE UR STRIP-MCNC: NEGATIVE MG/DL
HCT VFR BLD AUTO: 43.6 % (ref 35–47)
HGB BLD-MCNC: 13.1 G/DL (ref 11.7–15.7)
HGB UR QL STRIP: NEGATIVE
IMM GRANULOCYTES # BLD: 0 10E3/UL
IMM GRANULOCYTES NFR BLD: 0 %
KETONES UR STRIP-MCNC: 60 MG/DL
LEUKOCYTE ESTERASE UR QL STRIP: ABNORMAL
LIPASE SERPL-CCNC: 23 U/L (ref 13–60)
LYMPHOCYTES # BLD AUTO: 3.1 10E3/UL (ref 0.8–5.3)
LYMPHOCYTES NFR BLD AUTO: 26 %
MAGNESIUM SERPL-MCNC: 1.8 MG/DL (ref 1.7–2.3)
MCH RBC QN AUTO: 26.7 PG (ref 26.5–33)
MCHC RBC AUTO-ENTMCNC: 30 G/DL (ref 31.5–36.5)
MCV RBC AUTO: 89 FL (ref 78–100)
MONOCYTES # BLD AUTO: 0.9 10E3/UL (ref 0–1.3)
MONOCYTES NFR BLD AUTO: 8 %
MUCOUS THREADS #/AREA URNS LPF: PRESENT /LPF
NEUTROPHILS # BLD AUTO: 7.8 10E3/UL (ref 1.6–8.3)
NEUTROPHILS NFR BLD AUTO: 65 %
NITRATE UR QL: NEGATIVE
NRBC # BLD AUTO: 0 10E3/UL
NRBC BLD AUTO-RTO: 0 /100
PH UR STRIP: 6.5 [PH] (ref 5–7)
PLATELET # BLD AUTO: 339 10E3/UL (ref 150–450)
POTASSIUM SERPL-SCNC: 2.9 MMOL/L (ref 3.4–5.3)
PROT SERPL-MCNC: 7.7 G/DL (ref 6.4–8.3)
RBC # BLD AUTO: 4.91 10E6/UL (ref 3.8–5.2)
RBC URINE: 5 /HPF
SODIUM SERPL-SCNC: 137 MMOL/L (ref 136–145)
SP GR UR STRIP: 1 (ref 1–1.03)
SQUAMOUS EPITHELIAL: 2 /HPF
UROBILINOGEN UR STRIP-MCNC: 6 MG/DL
WBC # BLD AUTO: 12 10E3/UL (ref 4–11)
WBC URINE: 8 /HPF

## 2022-12-03 PROCEDURE — 99285 EMERGENCY DEPT VISIT HI MDM: CPT | Mod: 25

## 2022-12-03 PROCEDURE — 258N000003 HC RX IP 258 OP 636: Performed by: EMERGENCY MEDICINE

## 2022-12-03 PROCEDURE — 250N000011 HC RX IP 250 OP 636: Performed by: EMERGENCY MEDICINE

## 2022-12-03 PROCEDURE — 96366 THER/PROPH/DIAG IV INF ADDON: CPT

## 2022-12-03 PROCEDURE — 80048 BASIC METABOLIC PNL TOTAL CA: CPT | Performed by: EMERGENCY MEDICINE

## 2022-12-03 PROCEDURE — 82248 BILIRUBIN DIRECT: CPT | Performed by: EMERGENCY MEDICINE

## 2022-12-03 PROCEDURE — 83690 ASSAY OF LIPASE: CPT | Performed by: EMERGENCY MEDICINE

## 2022-12-03 PROCEDURE — 85025 COMPLETE CBC W/AUTO DIFF WBC: CPT | Performed by: EMERGENCY MEDICINE

## 2022-12-03 PROCEDURE — 250N000009 HC RX 250: Performed by: EMERGENCY MEDICINE

## 2022-12-03 PROCEDURE — 81001 URINALYSIS AUTO W/SCOPE: CPT | Performed by: EMERGENCY MEDICINE

## 2022-12-03 PROCEDURE — 96361 HYDRATE IV INFUSION ADD-ON: CPT

## 2022-12-03 PROCEDURE — 74177 CT ABD & PELVIS W/CONTRAST: CPT

## 2022-12-03 PROCEDURE — 96375 TX/PRO/DX INJ NEW DRUG ADDON: CPT

## 2022-12-03 PROCEDURE — 96376 TX/PRO/DX INJ SAME DRUG ADON: CPT

## 2022-12-03 PROCEDURE — 83735 ASSAY OF MAGNESIUM: CPT | Performed by: EMERGENCY MEDICINE

## 2022-12-03 PROCEDURE — 96365 THER/PROPH/DIAG IV INF INIT: CPT | Mod: 59

## 2022-12-03 PROCEDURE — 36415 COLL VENOUS BLD VENIPUNCTURE: CPT | Performed by: EMERGENCY MEDICINE

## 2022-12-03 PROCEDURE — 80053 COMPREHEN METABOLIC PANEL: CPT | Performed by: EMERGENCY MEDICINE

## 2022-12-03 RX ORDER — SODIUM CHLORIDE 9 MG/ML
INJECTION, SOLUTION INTRAVENOUS CONTINUOUS
Status: DISCONTINUED | OUTPATIENT
Start: 2022-12-03 | End: 2022-12-03 | Stop reason: HOSPADM

## 2022-12-03 RX ORDER — IOPAMIDOL 755 MG/ML
500 INJECTION, SOLUTION INTRAVASCULAR ONCE
Status: COMPLETED | OUTPATIENT
Start: 2022-12-03 | End: 2022-12-03

## 2022-12-03 RX ORDER — ONDANSETRON 4 MG/1
4 TABLET, ORALLY DISINTEGRATING ORAL EVERY 8 HOURS PRN
Qty: 10 TABLET | Refills: 0 | Status: SHIPPED | OUTPATIENT
Start: 2022-12-03 | End: 2022-12-06

## 2022-12-03 RX ORDER — POTASSIUM CHLORIDE 1.5 G/1.58G
20 POWDER, FOR SOLUTION ORAL DAILY
Qty: 7 PACKET | Refills: 0 | Status: SHIPPED | OUTPATIENT
Start: 2022-12-03 | End: 2022-12-10

## 2022-12-03 RX ORDER — ONDANSETRON 2 MG/ML
4 INJECTION INTRAMUSCULAR; INTRAVENOUS ONCE
Status: COMPLETED | OUTPATIENT
Start: 2022-12-03 | End: 2022-12-03

## 2022-12-03 RX ORDER — POTASSIUM CHLORIDE 7.45 MG/ML
10 INJECTION INTRAVENOUS ONCE
Status: COMPLETED | OUTPATIENT
Start: 2022-12-03 | End: 2022-12-03

## 2022-12-03 RX ORDER — SUCRALFATE ORAL 1 G/10ML
1 SUSPENSION ORAL 4 TIMES DAILY
Qty: 414 ML | Refills: 0 | Status: SHIPPED | OUTPATIENT
Start: 2022-12-03

## 2022-12-03 RX ORDER — KETOROLAC TROMETHAMINE 15 MG/ML
15 INJECTION, SOLUTION INTRAMUSCULAR; INTRAVENOUS ONCE
Status: COMPLETED | OUTPATIENT
Start: 2022-12-03 | End: 2022-12-03

## 2022-12-03 RX ADMIN — SODIUM CHLORIDE 65 ML: 9 INJECTION, SOLUTION INTRAVENOUS at 13:43

## 2022-12-03 RX ADMIN — ONDANSETRON 4 MG: 2 INJECTION INTRAMUSCULAR; INTRAVENOUS at 10:16

## 2022-12-03 RX ADMIN — KETOROLAC TROMETHAMINE 15 MG: 15 INJECTION, SOLUTION INTRAMUSCULAR; INTRAVENOUS at 13:31

## 2022-12-03 RX ADMIN — POTASSIUM CHLORIDE 10 MEQ: 7.46 INJECTION, SOLUTION INTRAVENOUS at 13:18

## 2022-12-03 RX ADMIN — IOPAMIDOL 100 ML: 755 INJECTION, SOLUTION INTRAVENOUS at 13:39

## 2022-12-03 RX ADMIN — ONDANSETRON 4 MG: 2 INJECTION INTRAMUSCULAR; INTRAVENOUS at 13:31

## 2022-12-03 RX ADMIN — SODIUM CHLORIDE 1000 ML: 9 INJECTION, SOLUTION INTRAVENOUS at 15:16

## 2022-12-03 RX ADMIN — SODIUM CHLORIDE 1000 ML: 9 INJECTION, SOLUTION INTRAVENOUS at 10:15

## 2022-12-03 ASSESSMENT — ACTIVITIES OF DAILY LIVING (ADL)
ADLS_ACUITY_SCORE: 33
ADLS_ACUITY_SCORE: 35
ADLS_ACUITY_SCORE: 35

## 2022-12-03 ASSESSMENT — ENCOUNTER SYMPTOMS
BLOOD IN STOOL: 0
NAUSEA: 1
ABDOMINAL PAIN: 1
DIARRHEA: 0
VOMITING: 1
CONSTIPATION: 0

## 2022-12-03 NOTE — ED PROVIDER NOTES
History   Chief Complaint:  Vomiting       The history is provided by the patient.      Tari Nicolas is a 47 year old female with history of gastrectomy who presents with vomiting. Patient reports vomiting and lightheadedness since having a stomach sleeve surgery in June. She reports that she fell about a month ago and had to have another surgery due to internal bleeding. She endorses lower abdominal pain, and not eating for about the last month because she is not able to keep it down. She reports loosing nearly 100 pounds since the stomach sleeve procedure and a history of a total hysterectomy due to cancer. She denies blood in her vomit, black/ tarry stool, or bowel movements in several weeks. She also denies taking medications for the pain.     Review of Systems   Gastrointestinal: Positive for abdominal pain, nausea and vomiting. Negative for blood in stool, constipation and diarrhea.   All other systems reviewed and are negative.    Allergies:  Ciprofloxacin  Hydrocodone  Oxycodone    Medications:  Carvedilol   Miralax     Past Medical History:     Dehydration     Past Surgical History:    Cholecystectomy   Gastrectomy   Hysterectomy      Family History:    Mother- hypertension     Social History:  Presents via private vehicle  Presents alone  PCP: No primary care provider on file.     Physical Exam     Patient Vitals for the past 24 hrs:   BP Temp Temp src Pulse Resp SpO2   12/03/22 1002 (!) 127/100 (!) 96.5  F (35.8  C) Temporal 111 18 99 %       Physical Exam  HENT:      Head: Normocephalic.      Nose: Nose normal.   Eyes:      General: No scleral icterus.  Cardiovascular:      Rate and Rhythm: Normal rate.      Pulses: Normal pulses.   Pulmonary:      Effort: Pulmonary effort is normal.   Abdominal:      General: Abdomen is flat.      Comments: Mild epigastric tenderness no guarding or rebound   Musculoskeletal:         General: Normal range of motion.   Skin:     General: Skin is warm.      Capillary  Refill: Capillary refill takes less than 2 seconds.      Coloration: Skin is not jaundiced.   Neurological:      General: No focal deficit present.      Mental Status: She is alert.   Psychiatric:         Mood and Affect: Mood normal.         Emergency Department Course   Imaging:  CT Abdomen Pelvis w Contrast   Final Result   IMPRESSION:       1.  No acute abnormality in the abdomen or pelvis.      2.  Since 09/06/2022, near complete resolution of the probable hematoma along the greater curvature of the stomach after drainage. A small irregular residual hematoma remains, the largest component of which measures 1.4 cm. No new fluid collections.        Report per radiology    Laboratory:  Labs Ordered and Resulted from Time of ED Arrival to Time of ED Departure   BASIC METABOLIC PANEL - Abnormal       Result Value    Sodium 137      Potassium 2.9 (*)     Chloride 90 (*)     Carbon Dioxide (CO2) 23      Anion Gap 24 (*)     Urea Nitrogen 8.5      Creatinine 0.63      Calcium 9.9      Glucose 131 (*)     GFR Estimate >90     CBC WITH PLATELETS AND DIFFERENTIAL - Abnormal    WBC Count 12.0 (*)     RBC Count 4.91      Hemoglobin 13.1      Hematocrit 43.6      MCV 89      MCH 26.7      MCHC 30.0 (*)     RDW 17.8 (*)     Platelet Count 339      % Neutrophils 65      % Lymphocytes 26      % Monocytes 8      % Eosinophils 1      % Basophils 0      % Immature Granulocytes 0      NRBCs per 100 WBC 0      Absolute Neutrophils 7.8      Absolute Lymphocytes 3.1      Absolute Monocytes 0.9      Absolute Eosinophils 0.1      Absolute Basophils 0.1      Absolute Immature Granulocytes 0.0      Absolute NRBCs 0.0     HEPATIC FUNCTION PANEL - Abnormal    Protein Total 7.7      Albumin 4.2      Bilirubin Total 1.2      Alkaline Phosphatase 92      AST 40 (*)     ALT 31      Bilirubin Direct 0.23     UA MACROSCOPIC WITH REFLEX TO MICRO AND CULTURE - Abnormal    Color Urine Orange (*)     Appearance Urine Clear      Glucose Urine Negative       Bilirubin Urine Small (*)     Ketones Urine 60 (*)     Specific Gravity Urine 1.005      Blood Urine Negative      pH Urine 6.5      Protein Albumin Urine 50 (*)     Urobilinogen Urine 6.0 (*)     Nitrite Urine Negative      Leukocyte Esterase Urine Small (*)     Mucus Urine Present (*)     RBC Urine 5 (*)     WBC Urine 8 (*)     Squamous Epithelials Urine 2 (*)    LIPASE - Normal    Lipase 23     MAGNESIUM - Normal    Magnesium 1.8          Emergency Department Course:     Reviewed:  I reviewed nursing notes, vitals, past medical history and Care Everywhere    Assessments:  1318 I obtained history and examined the patient as noted above.   1423 I rechecked the patient and explained findings.   1607 I rechecked the patient     Interventions:  1015 NS 1,000mL IV  1016 Ondansetron 4mg IV  1318 Potassium chloride 10mEq IV   1331 Ondansetron 4mg IV  1331 Ketorolac 15mg IV  1516 NS 1,000mL IV     Disposition:  The patient was discharged to home.     Impression & Plan     Medical Decision Making:  Patient presents ongoing nausea and abdominal pain after gastric bypass surgery.  Patient is lost about 100 pounds.  Patient is otherwise well-appearing lab work does identify mild hypokalemia.  Ultimately CT was recommended due to history of gastric surgery and history of prior hematoma to reassess her gastric outlet.  Ultimately CT shows no signs of gastric outlet obstruction hematoma seems to be improving.  No need for readmission to the hospital this time.  Patient encouraged to follow-up with her bariatric program as an outpatient and was discharged home in stable condition.    Diagnosis:    ICD-10-CM    1. Vomiting, unspecified vomiting type, unspecified whether nausea present  R11.10           Discharge Medications:  New Prescriptions    ONDANSETRON (ZOFRAN ODT) 4 MG ODT TAB    Take 1 tablet (4 mg) by mouth every 8 hours as needed for nausea or vomiting    POTASSIUM CHLORIDE (KLOR-CON) 20 MEQ PACKET    Take 20 mEq by  mouth daily for 7 days    SUCRALFATE (CARAFATE) 1 GM/10ML SUSPENSION    Take 10 mLs (1 g) by mouth 4 times daily       Scribe Disclosure:  I, Cecelia Boris, am serving as a scribe at 1:16 PM on 12/3/2022 to document services personally performed by Leandro Esqueda MD based on my observations and the provider's statements to me.          Leandro Esqueda MD  12/17/22 2030

## 2022-12-03 NOTE — DISCHARGE INSTRUCTIONS
Continue oral hydration at home.  Use nausea medication when needed.  Please follow-up with your bariatric surgery clinic as causes for vomiting are unclear by CT scan or by lab work.  Return with large-volume blood in the vomit or black tarry stool or severe abdominal pain or fever greater than 100.4.

## 2022-12-03 NOTE — ED TRIAGE NOTES
Pt presents to ED with dry heaving and vomiting bile.   Has not been eating for 1.5 months, taking sips of water throughout the day per . Had a stomach sleeve in June and has difficulty since that time.   No Bm in weeks. C/O lower abdominal pain.   Has been told by surgeon that there isn't miuch they can do for her at this time. Pt also currently is in between insurance and wont have any until January so has been waiting for that to kick in.

## 2022-12-03 NOTE — ED PROVIDER NOTES
PIT/Triage Evaluation    Patient presented with complaints of nausea and vomiting that she struck been struggling with for weeks.  Has not had any nausea medication at home.  Reports prior abdominal surgery of gastric sleeve, in September she had laparoscopic surgery to remove some blood clots intra-abdominal he.  She reports since she has been home from the hospital at that time she had difficulty walking due to bilateral weakness in her legs.  Over the past couple of weeks she has been having more difficulty with nausea vomiting keeping liquids down.  She also reports she thinks she has UTI because of dysuria.    Exam is notable for     Gen: Resting comfortably   Eyes: Clear conjunctiva, no discharge  Ears: External ears normal without swelling or drainage  Mouth: Mucous membranes moist  CV: regular rate  Resp: speaking in full sentences without any resp distress  Skin: warm dry well perfused  Neuro: Alert, no gross motor or sensory deficits,   Psych: pleasant, affect appropriate    Appropriate interventions for symptom management were initiated if applicable.  Appropriate diagnostic tests were initiated if indicated.    Important information for subsequent clinician     Ordered fluid bolus, potassium infusion, imaging.     I briefly evaluated the patient and developed an initial plan of care. I discussed this plan and explained that this brief interaction does not constitute a full evaluation. Patient/family understands that they should wait to be fully evaluated and discuss any test results with another clinician prior to leaving the hospital.    Due to hospital and departmental capacity constraints and prolonged wait times, this patient was evaluated in non-traditional circumstances such as in triage/waiting room, a hallway, etc. I explained the option to wait for a traditional treatment space and apologized for the inconvenience. Given the circumstances, every attempt was made to provide for the patient's  comfort and privacy and to perform the most thorough evaluation possible.       Edgar Mcmahon MD  12/03/22 2412

## 2022-12-10 ENCOUNTER — TRANSFERRED RECORDS (OUTPATIENT)
Dept: HEALTH INFORMATION MANAGEMENT | Facility: CLINIC | Age: 47
End: 2022-12-10

## 2022-12-10 ENCOUNTER — HOSPITAL ENCOUNTER (EMERGENCY)
Facility: CLINIC | Age: 47
Discharge: HOME | End: 2022-12-10
Attending: EMERGENCY MEDICINE | Admitting: EMERGENCY MEDICINE

## 2022-12-10 VITALS
OXYGEN SATURATION: 100 % | SYSTOLIC BLOOD PRESSURE: 142 MMHG | HEIGHT: 71 IN | WEIGHT: 179 LBS | TEMPERATURE: 97 F | DIASTOLIC BLOOD PRESSURE: 100 MMHG | BODY MASS INDEX: 25.06 KG/M2 | HEART RATE: 93 BPM | RESPIRATION RATE: 12 BRPM

## 2022-12-10 DIAGNOSIS — E87.6 HYPOKALEMIA: ICD-10-CM

## 2022-12-10 DIAGNOSIS — R42 VERTIGO: ICD-10-CM

## 2022-12-10 LAB
ALBUMIN SERPL-MCNC: 3.9 G/DL (ref 3.4–5)
ALP SERPL-CCNC: 75 U/L (ref 40–150)
ALT SERPL W P-5'-P-CCNC: 48 U/L (ref 0–50)
ANION GAP SERPL CALCULATED.3IONS-SCNC: 10 MMOL/L (ref 3–14)
AST SERPL W P-5'-P-CCNC: 26 U/L (ref 0–45)
ATRIAL RATE - MUSE: 102 BPM
BASOPHILS # BLD AUTO: 0 10E3/UL (ref 0–0.2)
BASOPHILS NFR BLD AUTO: 1 %
BILIRUB SERPL-MCNC: 0.7 MG/DL (ref 0.2–1.3)
BUN SERPL-MCNC: 7 MG/DL (ref 7–30)
CALCIUM SERPL-MCNC: 9.7 MG/DL (ref 8.5–10.1)
CHLORIDE BLD-SCNC: 96 MMOL/L (ref 94–109)
CO2 SERPL-SCNC: 28 MMOL/L (ref 20–32)
CREAT SERPL-MCNC: 0.7 MG/DL (ref 0.52–1.04)
D DIMER PPP FEU-MCNC: 0.36 UG/ML FEU (ref 0–0.5)
DIASTOLIC BLOOD PRESSURE - MUSE: NORMAL MMHG
EOSINOPHIL # BLD AUTO: 0.1 10E3/UL (ref 0–0.7)
EOSINOPHIL NFR BLD AUTO: 1 %
ERYTHROCYTE [DISTWIDTH] IN BLOOD BY AUTOMATED COUNT: 18.7 % (ref 10–15)
GFR SERPL CREATININE-BSD FRML MDRD: >90 ML/MIN/1.73M2
GLUCOSE BLD-MCNC: 95 MG/DL (ref 70–99)
HCT VFR BLD AUTO: 35.9 % (ref 35–47)
HGB BLD-MCNC: 11.7 G/DL (ref 11.7–15.7)
IMM GRANULOCYTES # BLD: 0 10E3/UL
IMM GRANULOCYTES NFR BLD: 0 %
INTERPRETATION ECG - MUSE: NORMAL
LYMPHOCYTES # BLD AUTO: 3.1 10E3/UL (ref 0.8–5.3)
LYMPHOCYTES NFR BLD AUTO: 37 %
MAGNESIUM SERPL-MCNC: 1.8 MG/DL (ref 1.6–2.3)
MCH RBC QN AUTO: 27.8 PG (ref 26.5–33)
MCHC RBC AUTO-ENTMCNC: 32.6 G/DL (ref 31.5–36.5)
MCV RBC AUTO: 85 FL (ref 78–100)
MONOCYTES # BLD AUTO: 0.7 10E3/UL (ref 0–1.3)
MONOCYTES NFR BLD AUTO: 9 %
NEUTROPHILS # BLD AUTO: 4.4 10E3/UL (ref 1.6–8.3)
NEUTROPHILS NFR BLD AUTO: 52 %
NRBC # BLD AUTO: 0 10E3/UL
NRBC BLD AUTO-RTO: 0 /100
P AXIS - MUSE: 74 DEGREES
PHOSPHATE SERPL-MCNC: 2.5 MG/DL (ref 2.5–4.5)
PLATELET # BLD AUTO: 346 10E3/UL (ref 150–450)
POTASSIUM BLD-SCNC: 2.6 MMOL/L (ref 3.4–5.3)
POTASSIUM BLD-SCNC: 3.5 MMOL/L (ref 3.4–5.3)
PR INTERVAL - MUSE: 130 MS
PROT SERPL-MCNC: 8.2 G/DL (ref 6.8–8.8)
QRS DURATION - MUSE: 72 MS
QT - MUSE: 328 MS
QTC - MUSE: 427 MS
R AXIS - MUSE: 60 DEGREES
RBC # BLD AUTO: 4.21 10E6/UL (ref 3.8–5.2)
SODIUM SERPL-SCNC: 134 MMOL/L (ref 133–144)
SYSTOLIC BLOOD PRESSURE - MUSE: NORMAL MMHG
T AXIS - MUSE: 266 DEGREES
TROPONIN I SERPL HS-MCNC: 10 NG/L
VENTRICULAR RATE- MUSE: 102 BPM
WBC # BLD AUTO: 8.4 10E3/UL (ref 4–11)

## 2022-12-10 PROCEDURE — 85025 COMPLETE CBC W/AUTO DIFF WBC: CPT | Performed by: EMERGENCY MEDICINE

## 2022-12-10 PROCEDURE — 84132 ASSAY OF SERUM POTASSIUM: CPT | Performed by: EMERGENCY MEDICINE

## 2022-12-10 PROCEDURE — 96365 THER/PROPH/DIAG IV INF INIT: CPT

## 2022-12-10 PROCEDURE — 84100 ASSAY OF PHOSPHORUS: CPT | Performed by: EMERGENCY MEDICINE

## 2022-12-10 PROCEDURE — 85379 FIBRIN DEGRADATION QUANT: CPT | Performed by: EMERGENCY MEDICINE

## 2022-12-10 PROCEDURE — 84484 ASSAY OF TROPONIN QUANT: CPT | Performed by: EMERGENCY MEDICINE

## 2022-12-10 PROCEDURE — 80053 COMPREHEN METABOLIC PANEL: CPT | Performed by: EMERGENCY MEDICINE

## 2022-12-10 PROCEDURE — 250N000011 HC RX IP 250 OP 636: Performed by: EMERGENCY MEDICINE

## 2022-12-10 PROCEDURE — 36415 COLL VENOUS BLD VENIPUNCTURE: CPT | Performed by: EMERGENCY MEDICINE

## 2022-12-10 PROCEDURE — 93005 ELECTROCARDIOGRAM TRACING: CPT

## 2022-12-10 PROCEDURE — 83735 ASSAY OF MAGNESIUM: CPT | Performed by: EMERGENCY MEDICINE

## 2022-12-10 PROCEDURE — 99284 EMERGENCY DEPT VISIT MOD MDM: CPT | Mod: 25

## 2022-12-10 PROCEDURE — 250N000013 HC RX MED GY IP 250 OP 250 PS 637: Performed by: EMERGENCY MEDICINE

## 2022-12-10 RX ORDER — POTASSIUM CHLORIDE 1.5 G/1.58G
40 POWDER, FOR SOLUTION ORAL ONCE
Status: COMPLETED | OUTPATIENT
Start: 2022-12-10 | End: 2022-12-10

## 2022-12-10 RX ORDER — MECLIZINE HYDROCHLORIDE 25 MG/1
25 TABLET ORAL ONCE
Status: COMPLETED | OUTPATIENT
Start: 2022-12-10 | End: 2022-12-10

## 2022-12-10 RX ORDER — POTASSIUM CHLORIDE 7.45 MG/ML
10 INJECTION INTRAVENOUS ONCE
Status: COMPLETED | OUTPATIENT
Start: 2022-12-10 | End: 2022-12-10

## 2022-12-10 RX ADMIN — POTASSIUM CHLORIDE 10 MEQ: 7.46 INJECTION, SOLUTION INTRAVENOUS at 03:16

## 2022-12-10 RX ADMIN — POTASSIUM CHLORIDE 40 MEQ: 1.5 POWDER, FOR SOLUTION ORAL at 03:56

## 2022-12-10 RX ADMIN — MECLIZINE HYDROCHLORIDE 25 MG: 25 TABLET ORAL at 03:56

## 2022-12-10 ASSESSMENT — ENCOUNTER SYMPTOMS
NAUSEA: 1
ARTHRALGIAS: 1
APPETITE CHANGE: 1
CHEST TIGHTNESS: 0
DIZZINESS: 1
MYALGIAS: 1
VOMITING: 1

## 2022-12-10 ASSESSMENT — ACTIVITIES OF DAILY LIVING (ADL)
ADLS_ACUITY_SCORE: 33
ADLS_ACUITY_SCORE: 35
ADLS_ACUITY_SCORE: 35

## 2022-12-10 NOTE — DISCHARGE INSTRUCTIONS
Discharge Instructions  Vertigo  You have been diagnosed with vertigo.  This is a dizzy feeling often described as spinning or that the room is moving around you. You will often have nausea (sick to your stomach), vomiting (throwing up), and balance problems with it.  Vertigo is usually caused by a problem in the inner ear which helps control your balance.  Many things can cause vertigo, including calcium collections in the inner ear, a virus infection of the inner ear, concussion, migraine, and some medicines.  Luckily, these causes are not life threatening and will eventually go away.  However, sometimes there is a serious problem that does not show up right away.  Generally, every Emergency Department visit should have a follow-up clinic visit with either a primary or a specialty clinic/provider. Please follow-up as instructed by your emergency provider today.  Return to the Emergency Department if you have:  New or severe headache.  Double vision (seeing two of things).  Trouble speaking or hearing.  Weakness or trouble moving/using one side of your body.  Passing out.  Numbness or tingling.  Chest pain.  Vomiting that will not stop.    Treatment:  There are several commonly prescribed medications:  Antihistamines such as meclizine (Antivert ), dimenhydrinate (Dramamine ), or diphenhydramine (Benadryl ).  Prescription anti-nausea medicines, such as promethazine (Phenergan ), metoclopramide (Reglan ), or ondansetron (Zofran ).  Prescription sedative medicines, such as diazepam (Valium ), lorazepam (Ativan ), or clonazepam (Klonopin ).  Most of these medicines make you sleepy, and you should not take them before you work or drive. You should only take prescription medicines to treat severe vertigo symptoms, and you should stop the medicine when your symptoms improve.    Follow Up:  If you have vertigo longer than three days, it is important that you follow up either with your primary provider or an Ear, Nose, and  Throat (ENT) specialist.  You may need further testing to evaluate your vertigo and you may also need  vestibular  therapy which is a special form of physical therapy to make the vertigo go away.    If you were given a prescription for medicine here today, be sure to read all of the information (including the package insert) that comes with your prescription.  This will include important information about the medicine, its side effects, and any warnings that you need to know about.  The pharmacist who fills the prescription can provide more information and answer questions you may have about the medicine.  If you have questions or concerns that the pharmacist cannot address, please call or return to the Emergency Department.     Remember that you can always come back to the Emergency Department if you are not able to see your regular provider in the amount of time listed above, if you get any new symptoms, or if there is anything that worries you.

## 2022-12-10 NOTE — ED PROVIDER NOTES
"  History   Chief Complaint:  Hypertension and Eye Problem       The history is provided by the patient, the spouse and a relative.      Tari Nicolas is a 47 year old female with history of gastrectomy laparoscopic sleeve who presents with chest and leg pain. Patient states that she has had a lot of pain in her legs that radiates into her toes. Along with that she has chest pain, dizziness, ringing in ears, and vomiting. She describes the leg pain as tightness and states that she is having weakness as well. Her chest pain is not tightness just pain. She did have gastrectomy laparoscopic sleeve surgery at the end of June and she has lost around 100 lbs. Since then she has been struggling with acid reflux and vomiting. She was seen at the emergency room last week and was given Zofran and Potassium chloride.    Review of Systems   Constitutional: Positive for appetite change.   Eyes: Positive for visual disturbance.   Respiratory: Negative for chest tightness.    Cardiovascular: Positive for chest pain.   Gastrointestinal: Positive for nausea and vomiting.   Musculoskeletal: Positive for arthralgias and myalgias.   Neurological: Positive for dizziness.   10 point review of systems performed and is negative except as above and in HPI.      Allergies:  Ciprofloxacin  Hydrocodone  Oxycodone    Medications:  Carvedilol   Miralax     Past Medical History:     Dehydration     Past Surgical History:    Cholecystectomy  Gastrectomy Laparoscopic sleeve  Laparoscopic EVAN with BSO  Incision and drainage abdomen washout  Laparoscopy diagnostic     Family History:    Hypertension    Social History:  PCP: No Ref-Primary, Physician   Patient came from home.  Patient is unaccompanied in the ED.    Physical Exam     Patient Vitals for the past 24 hrs:   BP Temp Temp src Pulse Resp SpO2 Height Weight   12/10/22 0022 133/80 98.4  F (36.9  C) Temporal 106 14 100 % 1.803 m (5' 11\") 81.2 kg (179 lb)       Physical " Exam  General: Resting on the gurney, appears uncomfortable  Head:  The scalp, face, and head appear normal  Mouth/Throat: Mucus membranes are moist  CV:  Regular rate    Normal S1 and S2  No pathological murmur   Resp:  Breath sounds clear and equal bilaterally    Non-labored, no retractions or accessory muscle use    No coarseness    No wheezing   GI:  Abdomen is soft, no rigidity    No tenderness to palpation  MS:  Normal motor assessment of all extremities.    Good capillary refill noted.    Skin:  No rash or lesions noted.  Neuro: Speech is normal and fluent. No apparent deficit.  Psych: Awake. Alert.  Normal affect.      Appropriate interactions.      Emergency Department Course   ECG  ECG results from 12/10/22   EKG 12-lead, tracing only     Value    Systolic Blood Pressure     Diastolic Blood Pressure     Ventricular Rate 102    Atrial Rate 102    MN Interval 130    QRS Duration 72        QTc 427    P Axis 74    R AXIS 60    T Axis 266    Interpretation ECG      Sinus tachycardia  Right atrial enlargement  Marked ST abnormality, possible inferior subendocardial injury  Abnormal ECG  When compared with ECG of 29-JUN-2022 14:43,  ST now depressed in Inferior leads  ST now depressed in Lateral leads  T wave inversion more evident in Inferior leads  T wave inversion now evident in Anterolateral leads  Confirmed by GENERATED REPORT, COMPUTER ([id number]),  Karen De Leon ([id number]) on 12/10/2022 12:43:26 AM           Laboratory:  Labs Ordered and Resulted from Time of ED Arrival to Time of ED Departure   COMPREHENSIVE METABOLIC PANEL - Abnormal       Result Value    Sodium 134      Potassium 2.6 (*)     Chloride 96      Carbon Dioxide (CO2) 28      Anion Gap 10      Urea Nitrogen 7      Creatinine 0.70      Calcium 9.7      Glucose 95      Alkaline Phosphatase 75      AST 26      ALT 48      Protein Total 8.2      Albumin 3.9      Bilirubin Total 0.7      GFR Estimate >90     CBC WITH PLATELETS AND  DIFFERENTIAL - Abnormal    WBC Count 8.4      RBC Count 4.21      Hemoglobin 11.7      Hematocrit 35.9      MCV 85      MCH 27.8      MCHC 32.6      RDW 18.7 (*)     Platelet Count 346      % Neutrophils 52      % Lymphocytes 37      % Monocytes 9      % Eosinophils 1      % Basophils 1      % Immature Granulocytes 0      NRBCs per 100 WBC 0      Absolute Neutrophils 4.4      Absolute Lymphocytes 3.1      Absolute Monocytes 0.7      Absolute Eosinophils 0.1      Absolute Basophils 0.0      Absolute Immature Granulocytes 0.0      Absolute NRBCs 0.0     TROPONIN I - Normal    Troponin I High Sensitivity 10     MAGNESIUM - Normal    Magnesium 1.8     D DIMER QUANTITATIVE - Normal    D-Dimer Quantitative 0.36     PHOSPHORUS - Normal    Phosphorus 2.5     POTASSIUM - Normal    Potassium 3.5          Emergency Department Course:     Reviewed:  I reviewed nursing notes, vitals, past medical history and Care Everywhere    Assessments:  0042 I obtained history and examined the patient as noted above.    I rechecked the patient and explained findings.     Interventions:  Medications   potassium chloride 10 mEq in 100 mL sterile water infusion (0 mEq Intravenous Stopped 12/10/22 0430)   potassium chloride (KLOR-CON) Packet 40 mEq (40 mEq Oral Given 12/10/22 0356)   meclizine (ANTIVERT) tablet 25 mg (25 mg Oral Given 12/10/22 0356)     Disposition:  The patient was discharged to home.     Impression & Plan     Medical Decision Making:  Tari Nicolas is a 47 year old female who presents to the emergency department with multiple complaints.  Laboratory evaluation was undertaken and she was found to be profoundly hypokalemic.  EKG was obtained and did have abnormalities.  Her EKG was repeated after potassium and had normalized.  She was given fluids as well as potassium and reported feeling considerably better.  Additionally she describes vertigo symptoms.  These improved somewhat with the meclizine did not fully resolved.   This seems clearly peripheral in origin and I recommended she do vestibular physical therapy.  This was ordered from the emergency department.  I have instructed her to eat higher potassium foods and I recommended she contact her gastric surgery team to discuss her nutritional status, hypokalemia, and symptoms.  She will return if feeling worse and is eager for discharge home.  She is discharged per her request with instructions to follow-up.      Diagnosis:    ICD-10-CM    1. Hypokalemia  E87.6       2. Vertigo  R42 Physical Therapy Referral          Scribe Disclosure:  IJose, am serving as a scribe at 12:45 AM on 12/10/2022 to document services personally performed by Sol Mathews MD based on my observations and the provider's statements to me.            Sol Mathews MD  12/10/22 0753

## 2023-01-05 DIAGNOSIS — Z98.84 S/P LAPAROSCOPIC SLEEVE GASTRECTOMY: Primary | ICD-10-CM

## 2023-01-09 ENCOUNTER — HOSPITAL ENCOUNTER (OUTPATIENT)
Dept: PHYSICAL THERAPY | Facility: CLINIC | Age: 48
Setting detail: THERAPIES SERIES
Discharge: HOME | End: 2023-01-09
Attending: EMERGENCY MEDICINE
Payer: COMMERCIAL

## 2023-01-09 DIAGNOSIS — R42 VERTIGO: ICD-10-CM

## 2023-01-09 DIAGNOSIS — I89.0 LYMPHEDEMA: Primary | ICD-10-CM

## 2023-01-09 PROCEDURE — 97162 PT EVAL MOD COMPLEX 30 MIN: CPT | Mod: GP | Performed by: PHYSICAL THERAPIST

## 2023-01-09 PROCEDURE — 97112 NEUROMUSCULAR REEDUCATION: CPT | Mod: GP | Performed by: PHYSICAL THERAPIST

## 2023-02-13 ENCOUNTER — HOSPITAL ENCOUNTER (OUTPATIENT)
Dept: OCCUPATIONAL THERAPY | Facility: CLINIC | Age: 48
Setting detail: THERAPIES SERIES
Discharge: HOME | End: 2023-02-13
Attending: SURGERY
Payer: COMMERCIAL

## 2023-02-13 DIAGNOSIS — I89.0 LYMPHEDEMA: ICD-10-CM

## 2023-02-13 PROCEDURE — 97166 OT EVAL MOD COMPLEX 45 MIN: CPT | Mod: GO | Performed by: OCCUPATIONAL THERAPIST

## 2023-02-13 PROCEDURE — 97140 MANUAL THERAPY 1/> REGIONS: CPT | Mod: GO | Performed by: OCCUPATIONAL THERAPIST

## 2023-02-15 NOTE — PROGRESS NOTES
02/13/23 1500   Rehab Discipline   Discipline OT   Type of Visit   Type of visit Initial Edema Evaluation       present No   General Information   Start of care 02/13/23   Referring physician Dr. 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   Pertinent history of current problem (PT: include personal factors and/or comorbidities that impact the POC; OT: include additional occupational profile info) see above   Surgical / medical history reviewed Yes   Prior level of functional mobility Pt lived independently and was working full time including driving the dispatch. She now needs assist at times to walk, is very sensitve to cold, has high pain, can only sit for the dispatch at work and has pain.   Prior treatment Compression garments;Elevation  (from  [facility] - they hurt)   Community support Family / friend caregiver   Patient role / employment history Employed  (dispatch)   Psychosocial concerns Impaired coping;Impaired sleep;Financial concerns   Living environment House / [facility]   Living environment comments Pt lives at home with her  and child   Assistive device comments none   Fall Risk Screen   Fall screen completed by OT   Have you fallen 2 or more times in the past  year? No   Have you fallen and had an injury in the past year? Yes  (only the fall post op that seemed to cause the hemorrhage)   Is patient a fall risk? No   Abuse Screen (yes response referral indicated)   Feels Unsafe at Home or Work/School no   Feels Threatened by Someone no   Does Anyone Try to Keep You From Having Contact with Others or Doing Things Outside Your Home? no   Physical Signs of Abuse Present no   Patient needs abuse support services and resources No   System Outcome Measures   Outcome Measures Lymphedema   Lymphedema Life Impact Scale (score range 0-72). A higher score indicates greater impairment. 31   Subjective Report   Patient report of symptoms Pt has pain, heaviness.  The knee heaviness is impacting her walking and stifness. This is olegario painful at night.  she has shooting pain as well.Her acid reflux has improved and she sleeps in bed.   Precautions   Precautions comments none   Patient / Family Goals   Patient / family goals statement Pt wishes to understand her swelling, what to do for it and how to deal with it.   Pain   Patient currently in pain Yes   Pain location BLE   Pain rating current 0/10, but 6/10 in evening then shooting pains consistent with nerve regeneration.   Pain description Heaviness;Ache;Pressure;Discomfort   Edema Exam / Assessment   Skin condition Non-pitting;Intact   Skin condition comments Pt has positive stemmer on BLE, she has fullness, but not pitting or fibrosis   Scar   (not assessed - swelling tends to be from mid thigh down)   Capillary refill Symmetrical   Dorsal pedal pulse Symmetrical   Stemmer sign Positive   Stemmer sign comments IN BLE   Ulceration No   Girth Measurements   Girth Measurements Refer to separate girth measurement flowsheet   Volume LE   Right LE (mL) 7390   Left LE (mL) 7061   LE volume comparison RLE volume greater than LLE volume   % difference 4.4   Range of Motion   ROM Other   ROM comments Knees limited by swelling   Strength    Strength No deficits were identified   Strength comments She sit to  extension due to knee fullness   Posture   Posture Normal   Palpation   Palpation Pt has pain with palpation   Activities of Daily Living   Activities of Daily Living Pt is independent with ADLs.  She has great pain end of day that limits sleep, sitting makes her full for work, she has difficulty walking and feeling balanced   Bed Mobility   Bed mobility Pt is independent with bed mobility   Transfers   Transfers Pt is independnet iwht transfers   Gait / Locomotion   Gait / Locomotion Pt is independent to walk in clinic but is not steady.  she has a trendelenberg gait and rotates externally in the R leg   Sensory   Sensory perception Light touch   Light touch Intact   Vascular Assessment   Vascular Assessment Comments no concerns   Coordination   Coordination Gross motor coordination appropriate   Muscle Tone   Muscle tone No deficits were identified   Planned Edema Interventions   Planned edema interventions Manual lymph drainage;Gradient compression bandaging;Fit for compression garment;Exercises;Precautions to prevent infection / exacerbation;Education;Manual therapy;ADL training;Skin care / precautions;Home management program development   Clinical Impression   Criteria for skilled therapeutic intervention met Yes   Therapy diagnosis BLE lymphedema   Influenced by the following impairments / conditions Edema;Stage 2   Assessment of Occupational Performance 3-5 Performance Deficits   Identified Performance Deficits walking, pain, sitting   Clinical Decision Making (Complexity) Moderate complexity   Treatment Frequency 1x/week   Treatment duration 6 weeks   Patient / family and/or staff in agreement with plan of care Yes   Risks and benefits of therapy have been explained Yes   Education Assessment   Preferred learning style Listening;Reading;Demonstration;Pictures / video   Barriers to learning No barriers   Goals   Edema Eval Goals  1;2;3;4;5;6   Goal 1   Goal identifier 1   Goal description In order to improve functional mobility for activities of living, by the completion of intensive treatment, patient and/or caregiver will;   Verbalize and/or demonstrate understanding of techniques to independently manage their lymphedema at home   Target date 04/28/23   Goal 2   Goal identifier 1a   Goal description tolerate gradient compression bandaging/wearing compression garments 23 hrs/day to decrease volume of extracellular fluid   Target date 04/28/23   Goal 3   Goal identifier 1b   Goal description demonstrate independence in applying gradient compression bandages   Target date 04/28/23   Goal 4   Goal identifier 1c   Goal description demonstrate independence in performing prescribed exercises to facilate the lymph system   Target date 04/28/23   Goal 5   Goal identifier 1d   Goal description be independent in donning/doffing, wearing schedule, and care of compression garments   Target date 04/28/23   Goal 6   Goal identifier 2   Goal description Pt will reduce pain/leg girth to have a steadier gait 1/4L per leg.   Target date 04/28/23   Total Evaluation Time   OT Eval, Moderate Complexity Minutes (90872) 45       DISCHARGE  Reason for Discharge: Patient has failed to schedule further appointments.    Equipment Issued: none    Discharge Plan: Please reconsult as needed.    Referring Provider:  Michael Villarreal

## 2023-03-20 ENCOUNTER — HOSPITAL ENCOUNTER (OUTPATIENT)
Dept: OCCUPATIONAL THERAPY | Facility: CLINIC | Age: 48
Setting detail: THERAPIES SERIES
Discharge: HOME | End: 2023-03-20
Attending: SURGERY
Payer: COMMERCIAL

## 2023-03-20 PROCEDURE — 97140 MANUAL THERAPY 1/> REGIONS: CPT | Mod: GO | Performed by: OCCUPATIONAL THERAPIST

## 2023-04-03 ENCOUNTER — DOCUMENTATION ONLY (OUTPATIENT)
Facility: CLINIC | Age: 48
End: 2023-04-03
Payer: COMMERCIAL

## 2023-04-03 DIAGNOSIS — I89.0 LYMPHEDEMA OF BOTH LOWER EXTREMITIES: Primary | ICD-10-CM

## 2023-04-03 DIAGNOSIS — Z53.9 ERRONEOUS ENCOUNTER--DISREGARD: ICD-10-CM

## 2023-04-24 ENCOUNTER — DOCUMENTATION ONLY (OUTPATIENT)
Facility: CLINIC | Age: 48
End: 2023-04-24
Payer: COMMERCIAL

## 2023-04-24 DIAGNOSIS — I89.0 LYMPHEDEMA OF BOTH LOWER EXTREMITIES: Primary | ICD-10-CM

## 2023-04-24 DIAGNOSIS — Z53.9 ERRONEOUS ENCOUNTER--DISREGARD: ICD-10-CM

## 2023-12-20 ENCOUNTER — APPOINTMENT (OUTPATIENT)
Dept: GENERAL RADIOLOGY | Facility: CLINIC | Age: 48
End: 2023-12-20
Attending: EMERGENCY MEDICINE
Payer: OTHER MISCELLANEOUS

## 2023-12-20 ENCOUNTER — HOSPITAL ENCOUNTER (EMERGENCY)
Facility: CLINIC | Age: 48
Discharge: HOME | End: 2023-12-20
Attending: EMERGENCY MEDICINE | Admitting: EMERGENCY MEDICINE
Payer: OTHER MISCELLANEOUS

## 2023-12-20 VITALS
OXYGEN SATURATION: 100 % | DIASTOLIC BLOOD PRESSURE: 67 MMHG | TEMPERATURE: 97 F | HEART RATE: 69 BPM | SYSTOLIC BLOOD PRESSURE: 119 MMHG | RESPIRATION RATE: 18 BRPM

## 2023-12-20 DIAGNOSIS — S13.9XXA NECK SPRAIN, INITIAL ENCOUNTER: ICD-10-CM

## 2023-12-20 DIAGNOSIS — S20.229A CONTUSION OF BACK, UNSPECIFIED LATERALITY, INITIAL ENCOUNTER: ICD-10-CM

## 2023-12-20 DIAGNOSIS — S80.12XA CONTUSION OF LEFT LOWER EXTREMITY, INITIAL ENCOUNTER: ICD-10-CM

## 2023-12-20 DIAGNOSIS — S09.90XA CLOSED HEAD INJURY, INITIAL ENCOUNTER: ICD-10-CM

## 2023-12-20 DIAGNOSIS — S40.021A ARM CONTUSION, RIGHT, INITIAL ENCOUNTER: ICD-10-CM

## 2023-12-20 PROCEDURE — 73610 X-RAY EXAM OF ANKLE: CPT | Mod: LT

## 2023-12-20 PROCEDURE — 99285 EMERGENCY DEPT VISIT HI MDM: CPT

## 2023-12-20 PROCEDURE — 72100 X-RAY EXAM L-S SPINE 2/3 VWS: CPT

## 2023-12-20 PROCEDURE — 73562 X-RAY EXAM OF KNEE 3: CPT | Mod: LT

## 2023-12-20 PROCEDURE — 73630 X-RAY EXAM OF FOOT: CPT | Mod: LT

## 2023-12-20 PROCEDURE — 250N000013 HC RX MED GY IP 250 OP 250 PS 637: Performed by: EMERGENCY MEDICINE

## 2023-12-20 PROCEDURE — 73060 X-RAY EXAM OF HUMERUS: CPT | Mod: RT

## 2023-12-20 PROCEDURE — 73590 X-RAY EXAM OF LOWER LEG: CPT | Mod: LT

## 2023-12-20 PROCEDURE — 72072 X-RAY EXAM THORAC SPINE 3VWS: CPT

## 2023-12-20 PROCEDURE — 73090 X-RAY EXAM OF FOREARM: CPT | Mod: RT

## 2023-12-20 PROCEDURE — 73130 X-RAY EXAM OF HAND: CPT | Mod: RT

## 2023-12-20 PROCEDURE — 71101 X-RAY EXAM UNILAT RIBS/CHEST: CPT | Mod: RT

## 2023-12-20 RX ORDER — METHOCARBAMOL 750 MG/1
750 TABLET, FILM COATED ORAL 4 TIMES DAILY PRN
Qty: 30 TABLET | Refills: 0 | Status: SHIPPED | OUTPATIENT
Start: 2023-12-20

## 2023-12-20 RX ORDER — ACETAMINOPHEN 500 MG
1000 TABLET ORAL ONCE
Status: COMPLETED | OUTPATIENT
Start: 2023-12-20 | End: 2023-12-20

## 2023-12-20 RX ORDER — METHOCARBAMOL 750 MG/1
750 TABLET, FILM COATED ORAL ONCE
Status: COMPLETED | OUTPATIENT
Start: 2023-12-20 | End: 2023-12-20

## 2023-12-20 RX ADMIN — ACETAMINOPHEN 1000 MG: 500 TABLET, FILM COATED ORAL at 18:17

## 2023-12-20 RX ADMIN — METHOCARBAMOL TABLETS 750 MG: 750 TABLET, COATED ORAL at 18:18

## 2023-12-20 ASSESSMENT — ACTIVITIES OF DAILY LIVING (ADL): ADLS_ACUITY_SCORE: 33

## 2023-12-20 NOTE — ED PROVIDER NOTES
"  History     Chief Complaint:  Fall    HPI   Tari Nicolas is a 48 year old female who presents after a fall earlier today. She states that she was at [facility] around 4 hours ago and was grabbing a case of water when 2 other cases of water fell on her, causing her to fall onto her right side. She reports right arm pain, as well as pain in her right-sided back and neck. She is unsure if she hit her head and reports that she \"was out of it\" after her fall. Endorses headache, nausea, and some dizziness. No vision changes, numbness or weakness in her arms or legs, chest pain, shortness of breath, vomiting or dental injury. She is not anticoagulated.    Independent Historian:   None - Patient Only      Medications:    Carvedilol   Miralax  Sucralfate     Past Medical History:    Cancer     Past Surgical History:    Laparoscopic cholecystectomy  Laparoscopic gastrectomy  EVAN with BSO  Incision and drainage     Physical Exam   Patient Vitals for the past 24 hrs:   BP Temp Temp src Pulse Resp SpO2   12/20/23 1818 119/67 -- -- 69 -- 100 %   12/20/23 1409 114/86 97  F (36.1  C) Temporal 88 18 100 %        Physical Exam  VS: Reviewed per above  HENT: Mucous membranes moist, no nuchal rigidity, no Cole sign or raccoon eyes.  No external signs head trauma.  Right lateral neck tenderness without midline C-spine tenderness.  EYES: sclera anicteric, EOMI  CV: Rate as noted,  regular rhythm.   RESP: Effort normal. Breath sounds are normal bilaterally.  GI: no tenderness/rebound/guarding, not distended.  NEURO: GCS 15, cranial nerves II through XII are intact, 5 out of 5 strength in all 4 extremities, sensation is intact light touch in all 4 extremities, no ataxia, normal FNF testing BL.  MSK: No deformity of the extremities  SKIN: Warm and dry      Emergency Department Course   Imaging:  XR Knee Left 3 Views   Final Result   IMPRESSION: There is no evidence of an acute tibia, fibula, ankle or foot fracture. Ankle mortise is " intact. Joint spaces are maintained. No Lisfranc subluxation on this nonweightbearing study.       Minimal arthrosis medial compartment left knee. No fracture or joint effusion.      Thoracic spine XR, 3 views   Final Result      Lumbar spine XR, 2-3 views   Final Result      XR Ankle Left G/E 3 Views   Final Result   IMPRESSION: There is no evidence of an acute tibia, fibula, ankle or foot fracture. Ankle mortise is intact. Joint spaces are maintained. No Lisfranc subluxation on this nonweightbearing study.       Minimal arthrosis medial compartment left knee. No fracture or joint effusion.      Foot XR, G/E 3 views, left   Final Result   IMPRESSION: There is no evidence of an acute tibia, fibula, ankle or foot fracture. Ankle mortise is intact. Joint spaces are maintained. No Lisfranc subluxation on this nonweightbearing study.       Minimal arthrosis medial compartment left knee. No fracture or joint effusion.      Ribs XR, unilat 3 views + PA chest, right   Final Result   IMPRESSION: There is no radiographic evidence of an acute displaced right-sided rib fracture with attention to the right lower rib cage near the skin marker.       No evidence of pneumonia or pulmonary edema. Cardiomediastinal silhouette and pulmonary vasculature are within normal limits. No pneumothorax or pleural effusion.      Radius/Ulna XR, PA & LAT, right   Final Result   IMPRESSION: There is no evidence of an acute right hand or forearm fracture. Joint spaces are maintained.      XR Hand Right G/E 3 Views   Final Result   IMPRESSION: There is no evidence of an acute right hand or forearm fracture. Joint spaces are maintained.      XR Tibia and Fibula Left 2 Views   Final Result   IMPRESSION: There is no evidence of an acute tibia, fibula, ankle or foot fracture. Ankle mortise is intact. Joint spaces are maintained. No Lisfranc subluxation on this nonweightbearing study.       Minimal arthrosis medial compartment left knee. No fracture or  joint effusion.      Humerus XR, G/E 2 views, right   Final Result   IMPRESSION: Within normal limits. No fracture.         Emergency Department Course & Assessments:       Interventions:  Medications   acetaminophen (TYLENOL) tablet 1,000 mg (1,000 mg Oral $Given 12/20/23 1817)   methocarbamol (ROBAXIN) tablet 750 mg (750 mg Oral $Given 12/20/23 1818)        Assessments:  1701 I entered the patient's room and obtained history.  1827 I rechecked the patient and explained findings. I believe that they are safe for discharge at this time.        Consultations/Discussion of Management or Tests:  None          Disposition:  The patient was discharged to home.     Impression & Plan    CMS Diagnoses: None    Medical Decision Making:  Patient presents to the ER for evaluation of injury sustained after fall at [facility] after cases of water reportedly falling on her.  Vital signs reassuring.  On exam I do not appreciate external signs of head trauma or signs of sinister intracranial pathology.  Patient agreed to hold off on neuroimaging at this juncture after discussion of risks and benefits.  Possible she is suffering from concussion and discussed natural history of this pathology.  Injuries about the neck appear to be muscular.  Lower suspicion for occult bony or spinal cord injury to the neck.  X-ray imaging of the chest and T and L-spine and right upper extremity and left lower leg were all negative for acute bony injury or PTX.  Symptoms improved with Robaxin and Tylenol.  Discussed symptom control as well as return precautions.  Primary care follow-up recommended.  Prescriptions for Robaxin provided to use in addition to ibuprofen and Tylenol.    Diagnosis:    ICD-10-CM    1. Closed head injury, initial encounter  S09.90XA       2. Neck sprain, initial encounter  S13.9XXA       3. Contusion of back, unspecified laterality, initial encounter  S20.229A       4. Arm contusion, right, initial encounter  S40.021A       5.  Contusion of left lower extremity, initial encounter  S80.12XA            Discharge Medications:  Discharge Medication List as of 12/20/2023  6:34 PM        START taking these medications    Details   methocarbamol (ROBAXIN) 750 MG tablet Take 1 tablet (750 mg) by mouth 4 times daily as needed for muscle spasms, Disp-30 tablet, R-0, Local Print           Scribe Disclosure:  I, Demond Hired, am serving as a scribe at 4:23 PM on 12/20/2023 to document services personally performed by Hamilton Bethea MD based on my observations and the provider's statements to me.   12/20/2023   Hamilton Bethea MD Lindenbaum, Elan, MD  12/20/23 4820

## 2023-12-20 NOTE — ED TRIAGE NOTES
Multiple 36-pack water cases fell onto pt at [facility] today. C/o bilateral knee, neck, right wrist, and right finger pain. Denies LOC. Unknown if pt hit head. VSS. ABCs intact. A/Ox4.

## 2023-12-20 NOTE — Clinical Note
Tari Nicolas was seen and treated in our emergency department on 12/20/2023.  She may return to work on 12/27/2023.  Ms Nicolas was seen in the ER for injuries and possible concussion that she sustained today. Please excuse her from work until her symptoms improve. She may return sooner if her symptoms allow. If she requires further time off than indicated in this note, she should seek reassessment in the clinic setting.     If you have any questions or concerns, please don't hesitate to call.      Hamilton Bethea MD

## 2024-02-24 ENCOUNTER — HEALTH MAINTENANCE LETTER (OUTPATIENT)
Age: 49
End: 2024-02-24

## 2025-03-09 ENCOUNTER — HEALTH MAINTENANCE LETTER (OUTPATIENT)
Age: 50
End: 2025-03-09

## 2025-04-26 ENCOUNTER — HEALTH MAINTENANCE LETTER (OUTPATIENT)
Age: 50
End: 2025-04-26

## (undated) DEVICE — GLOVE PROTEXIS MICRO 6.0  2D73PM60

## (undated) DEVICE — Device

## (undated) DEVICE — ESU LIGASURE MARYLAND LAPAROSCOPIC SLR/DVDR 5MMX37CM LF1937

## (undated) DEVICE — SUCTION CANISTER MEDIVAC LINER 3000ML W/LID 65651-530

## (undated) DEVICE — ESU ELEC BLADE 6" COATED E1450-6

## (undated) DEVICE — DRAPE IOBAN INCISE 23X17" 6650EZ

## (undated) DEVICE — GLOVE PROTEXIS MICRO 8.0  2D73PM80

## (undated) DEVICE — LINEN TOWEL PACK X5 5464

## (undated) DEVICE — ENDO SCOPE WARMER LF TM500

## (undated) DEVICE — DRSG GAUZE 4X4" 3033

## (undated) DEVICE — PREP DURAPREP 26ML APL 8630

## (undated) DEVICE — GLOVE PROTEXIS W/NEU-THERA 8.0  2D73TE80

## (undated) DEVICE — SU SILK 2-0 FSL 18" 677G

## (undated) DEVICE — PACK MAJOR SBA15MAFSI

## (undated) DEVICE — GLOVE PROTEXIS W/NEU-THERA 6.5  2D73TE65

## (undated) DEVICE — SU SILK 3-0 SH 30" K832H

## (undated) DEVICE — ESU GROUND PAD UNIVERSAL W/O CORD

## (undated) DEVICE — SOL NACL 0.9% INJ 1000ML BAG 2B1324X

## (undated) DEVICE — SUCTION IRR STRYKERFLOW II W/TIP 250-070-520

## (undated) DEVICE — SOL WATER IRRIG 1000ML BOTTLE 2F7114

## (undated) DEVICE — BLADE KNIFE SURG 10 371110

## (undated) DEVICE — BLADE KNIFE SURG 15 371115

## (undated) DEVICE — TUBING SUCTION 12"X1/4" N612

## (undated) DEVICE — SOL NACL 0.9% IRRIG 1000ML BOTTLE 2F7124

## (undated) DEVICE — ESU HOLDER LAP INST DISP PURPLE LONG 330MM H-PRO-330

## (undated) DEVICE — DRAIN JACKSON PRATT 19FR ROUND SU130-1325

## (undated) DEVICE — DRAIN JACKSON PRATT RESERVOIR 100ML SU130-1305

## (undated) DEVICE — ENDO POUCH UNIV RETRIEVAL SYSTEM INZII 10MM CD001

## (undated) DEVICE — ESU CORD MONOPOLAR 10'  E0510

## (undated) DEVICE — SYSTEM LAPAROVUE VISIBILITY LAPVUE10

## (undated) RX ORDER — PROPOFOL 10 MG/ML
INJECTION, EMULSION INTRAVENOUS
Status: DISPENSED
Start: 2022-09-18

## (undated) RX ORDER — NALOXONE HYDROCHLORIDE 0.4 MG/ML
INJECTION, SOLUTION INTRAMUSCULAR; INTRAVENOUS; SUBCUTANEOUS
Status: DISPENSED
Start: 2022-09-15

## (undated) RX ORDER — BUPIVACAINE HYDROCHLORIDE AND EPINEPHRINE 2.5; 5 MG/ML; UG/ML
INJECTION, SOLUTION EPIDURAL; INFILTRATION; INTRACAUDAL; PERINEURAL
Status: DISPENSED
Start: 2022-09-18

## (undated) RX ORDER — FENTANYL CITRATE 50 UG/ML
INJECTION, SOLUTION INTRAMUSCULAR; INTRAVENOUS
Status: DISPENSED
Start: 2022-09-15

## (undated) RX ORDER — ONDANSETRON 2 MG/ML
INJECTION INTRAMUSCULAR; INTRAVENOUS
Status: DISPENSED
Start: 2022-12-03

## (undated) RX ORDER — KETOROLAC TROMETHAMINE 15 MG/ML
INJECTION, SOLUTION INTRAMUSCULAR; INTRAVENOUS
Status: DISPENSED
Start: 2022-12-03

## (undated) RX ORDER — LABETALOL HYDROCHLORIDE 5 MG/ML
INJECTION, SOLUTION INTRAVENOUS
Status: DISPENSED
Start: 2022-09-18

## (undated) RX ORDER — FENTANYL CITRATE 50 UG/ML
INJECTION, SOLUTION INTRAMUSCULAR; INTRAVENOUS
Status: DISPENSED
Start: 2022-09-18

## (undated) RX ORDER — FLUMAZENIL 0.1 MG/ML
INJECTION, SOLUTION INTRAVENOUS
Status: DISPENSED
Start: 2022-09-15

## (undated) RX ORDER — CEFAZOLIN SODIUM/WATER 2 G/20 ML
SYRINGE (ML) INTRAVENOUS
Status: DISPENSED
Start: 2022-09-18